# Patient Record
Sex: FEMALE | Race: WHITE | NOT HISPANIC OR LATINO | Employment: FULL TIME | URBAN - METROPOLITAN AREA
[De-identification: names, ages, dates, MRNs, and addresses within clinical notes are randomized per-mention and may not be internally consistent; named-entity substitution may affect disease eponyms.]

---

## 2017-08-24 ENCOUNTER — APPOINTMENT (OUTPATIENT)
Dept: URGENT CARE | Facility: CLINIC | Age: 42
End: 2017-08-24

## 2017-08-24 ENCOUNTER — TRANSCRIBE ORDERS (OUTPATIENT)
Dept: URGENT CARE | Facility: CLINIC | Age: 42
End: 2017-08-24

## 2017-08-24 DIAGNOSIS — Z02.1 PHYSICAL EXAM, PRE-EMPLOYMENT: ICD-10-CM

## 2017-08-24 DIAGNOSIS — Z02.1 PHYSICAL EXAM, PRE-EMPLOYMENT: Primary | ICD-10-CM

## 2017-08-24 PROCEDURE — 36415 COLL VENOUS BLD VENIPUNCTURE: CPT

## 2017-08-24 PROCEDURE — 86480 TB TEST CELL IMMUN MEASURE: CPT

## 2017-08-27 LAB
ANNOTATION COMMENT IMP: NORMAL
GAMMA INTERFERON BACKGROUND BLD IA-ACNC: 0.08 IU/ML
M TB IFN-G BLD-IMP: NEGATIVE
M TB IFN-G CD4+ BCKGRND COR BLD-ACNC: <0.01 IU/ML
M TB IFN-G CD4+ T-CELLS BLD-ACNC: 0.06 IU/ML
MITOGEN IGNF BLD-ACNC: >10 IU/ML
QUANTIFERON-TB GOLD IN TUBE: NORMAL
SERVICE CMNT-IMP: NORMAL

## 2017-12-19 ENCOUNTER — HOSPITAL ENCOUNTER (EMERGENCY)
Facility: HOSPITAL | Age: 42
Discharge: HOME | End: 2017-12-19
Attending: EMERGENCY MEDICINE | Admitting: EMERGENCY MEDICINE
Payer: COMMERCIAL

## 2017-12-19 VITALS
DIASTOLIC BLOOD PRESSURE: 60 MMHG | BODY MASS INDEX: 39.65 KG/M2 | HEART RATE: 116 BPM | OXYGEN SATURATION: 98 % | HEIGHT: 65 IN | SYSTOLIC BLOOD PRESSURE: 112 MMHG | TEMPERATURE: 98.9 F | RESPIRATION RATE: 20 BRPM | WEIGHT: 238 LBS

## 2017-12-19 DIAGNOSIS — R19.7 DIARRHEA: ICD-10-CM

## 2017-12-19 DIAGNOSIS — R11.10 VOMITING: Primary | ICD-10-CM

## 2017-12-19 LAB
ALBUMIN SERPL BCP-MCNC: 4.4 G/DL (ref 3.5–5)
ALP SERPL-CCNC: 71 U/L (ref 46–116)
ALT SERPL W P-5'-P-CCNC: 34 U/L (ref 12–78)
ANION GAP SERPL CALCULATED.3IONS-SCNC: 11 MMOL/L (ref 4–13)
AST SERPL W P-5'-P-CCNC: 27 U/L (ref 5–45)
BASOPHILS # BLD AUTO: 0.01 THOUSANDS/ΜL (ref 0–0.1)
BASOPHILS NFR BLD AUTO: 0 % (ref 0–1)
BILIRUB SERPL-MCNC: 0.6 MG/DL (ref 0.2–1)
BUN SERPL-MCNC: 15 MG/DL (ref 5–25)
CALCIUM SERPL-MCNC: 9.2 MG/DL (ref 8.3–10.1)
CHLORIDE SERPL-SCNC: 101 MMOL/L (ref 100–108)
CO2 SERPL-SCNC: 25 MMOL/L (ref 21–32)
CREAT SERPL-MCNC: 0.91 MG/DL (ref 0.6–1.3)
EOSINOPHIL # BLD AUTO: 0.1 THOUSAND/ΜL (ref 0–0.61)
EOSINOPHIL NFR BLD AUTO: 1 % (ref 0–6)
ERYTHROCYTE [DISTWIDTH] IN BLOOD BY AUTOMATED COUNT: 13.2 % (ref 11.6–15.1)
GFR SERPL CREATININE-BSD FRML MDRD: 78 ML/MIN/1.73SQ M
GLUCOSE SERPL-MCNC: 125 MG/DL (ref 65–140)
HCT VFR BLD AUTO: 46.3 % (ref 34.8–46.1)
HGB BLD-MCNC: 15.7 G/DL (ref 11.5–15.4)
LIPASE SERPL-CCNC: 243 U/L (ref 73–393)
LYMPHOCYTES # BLD AUTO: 0.88 THOUSANDS/ΜL (ref 0.6–4.47)
LYMPHOCYTES NFR BLD AUTO: 7 % (ref 14–44)
MCH RBC QN AUTO: 31.4 PG (ref 26.8–34.3)
MCHC RBC AUTO-ENTMCNC: 33.9 G/DL (ref 31.4–37.4)
MCV RBC AUTO: 93 FL (ref 82–98)
MONOCYTES # BLD AUTO: 0.5 THOUSAND/ΜL (ref 0.17–1.22)
MONOCYTES NFR BLD AUTO: 4 % (ref 4–12)
NEUTROPHILS # BLD AUTO: 11.09 THOUSANDS/ΜL (ref 1.85–7.62)
NEUTS SEG NFR BLD AUTO: 88 % (ref 43–75)
PLATELET # BLD AUTO: 337 THOUSANDS/UL (ref 149–390)
PMV BLD AUTO: 10.5 FL (ref 8.9–12.7)
POTASSIUM SERPL-SCNC: 3.4 MMOL/L (ref 3.5–5.3)
PROT SERPL-MCNC: 8.3 G/DL (ref 6.4–8.2)
RBC # BLD AUTO: 5 MILLION/UL (ref 3.81–5.12)
SODIUM SERPL-SCNC: 137 MMOL/L (ref 136–145)
WBC # BLD AUTO: 12.58 THOUSAND/UL (ref 4.31–10.16)

## 2017-12-19 PROCEDURE — 93005 ELECTROCARDIOGRAM TRACING: CPT | Performed by: EMERGENCY MEDICINE

## 2017-12-19 PROCEDURE — 93005 ELECTROCARDIOGRAM TRACING: CPT

## 2017-12-19 PROCEDURE — 96361 HYDRATE IV INFUSION ADD-ON: CPT

## 2017-12-19 PROCEDURE — 83690 ASSAY OF LIPASE: CPT | Performed by: EMERGENCY MEDICINE

## 2017-12-19 PROCEDURE — 96374 THER/PROPH/DIAG INJ IV PUSH: CPT

## 2017-12-19 PROCEDURE — 96376 TX/PRO/DX INJ SAME DRUG ADON: CPT

## 2017-12-19 PROCEDURE — 80053 COMPREHEN METABOLIC PANEL: CPT | Performed by: EMERGENCY MEDICINE

## 2017-12-19 PROCEDURE — 36415 COLL VENOUS BLD VENIPUNCTURE: CPT | Performed by: EMERGENCY MEDICINE

## 2017-12-19 PROCEDURE — 85025 COMPLETE CBC W/AUTO DIFF WBC: CPT | Performed by: EMERGENCY MEDICINE

## 2017-12-19 PROCEDURE — 99284 EMERGENCY DEPT VISIT MOD MDM: CPT

## 2017-12-19 RX ORDER — ESCITALOPRAM OXALATE 20 MG/1
20 TABLET ORAL DAILY
COMMUNITY
End: 2021-08-04 | Stop reason: ALTCHOICE

## 2017-12-19 RX ORDER — ONDANSETRON 2 MG/ML
INJECTION INTRAMUSCULAR; INTRAVENOUS
Status: COMPLETED
Start: 2017-12-19 | End: 2017-12-19

## 2017-12-19 RX ORDER — ONDANSETRON 2 MG/ML
4 INJECTION INTRAMUSCULAR; INTRAVENOUS ONCE
Status: COMPLETED | OUTPATIENT
Start: 2017-12-19 | End: 2017-12-19

## 2017-12-19 RX ORDER — ASPIRIN 81 MG/1
81 TABLET ORAL DAILY
COMMUNITY
End: 2021-11-03 | Stop reason: HOSPADM

## 2017-12-19 RX ORDER — DIFLUNISAL 500 MG/1
500 TABLET, FILM COATED ORAL 2 TIMES DAILY
COMMUNITY
End: 2021-11-03 | Stop reason: HOSPADM

## 2017-12-19 RX ORDER — TRAZODONE HYDROCHLORIDE 100 MG/1
50 TABLET ORAL
COMMUNITY

## 2017-12-19 RX ORDER — TRAMADOL HYDROCHLORIDE 50 MG/1
50 TABLET ORAL ONCE
Status: DISCONTINUED | OUTPATIENT
Start: 2017-12-19 | End: 2017-12-19 | Stop reason: HOSPADM

## 2017-12-19 RX ORDER — TRAMADOL HYDROCHLORIDE 50 MG/1
50 TABLET ORAL EVERY 6 HOURS PRN
COMMUNITY

## 2017-12-19 RX ORDER — ONDANSETRON 4 MG/1
4 TABLET, FILM COATED ORAL EVERY 6 HOURS PRN
Qty: 12 TABLET | Refills: 0 | Status: SHIPPED | OUTPATIENT
Start: 2017-12-19 | End: 2021-08-04 | Stop reason: ALTCHOICE

## 2017-12-19 RX ADMIN — SODIUM CHLORIDE 1000 ML: 0.9 INJECTION, SOLUTION INTRAVENOUS at 16:00

## 2017-12-19 RX ADMIN — ONDANSETRON 4 MG: 2 INJECTION INTRAMUSCULAR; INTRAVENOUS at 15:45

## 2017-12-19 RX ADMIN — ONDANSETRON 4 MG: 2 INJECTION INTRAMUSCULAR; INTRAVENOUS at 16:54

## 2017-12-19 NOTE — DISCHARGE INSTRUCTIONS
Acute Nausea and Vomiting, Ambulatory Care   GENERAL INFORMATION:   Acute nausea and vomiting  starts suddenly, gets worse quickly, and lasts a short time  Nausea and vomiting may be caused by pregnancy, alcohol, infection, or medicines  Common related symptoms include the following:   · Fever    · Abdominal swelling    · Pain, tenderness, or a lump in the abdomen    · Splashing sounds heard in your stomach when you move  Seek immediate care for the following symptoms:   · Blood in your vomit or bowel movements    · Sudden, severe pain in your chest and upper abdomen after hard vomiting    · Dizziness, dry mouth, and thirst    · Urinating very little or not at all    · Muscle weakness, leg cramps, and trouble breathing    · A heart beat that is faster than normal    · Vomiting for more than 48 hours  Treatment for acute nausea and vomiting  may include medicines to calm your stomach and stop the vomiting  You may need IV fluids if you are dehydrated  Manage your nausea and vomiting:   · Drink liquids as directed to prevent dehydration  Ask how much liquid to drink each day and which liquids are best for you  You may need to drink an oral rehydration solution (ORS)  ORS contains water, salts, and sugar that are needed to replace the lost body fluids  Ask what kind of ORS to use, how much to drink, and where to get it  · Eat smaller meals, more often  Eat small amounts of food every 2 to 3 hours, even if you are not hungry  Food in your stomach may help decrease your nausea  · Avoid stress  Find ways to relax and manage your stress  Headaches due to stress may cause nausea and vomiting  Get more rest and sleep  Follow up with your healthcare provider as directed:  Write down your questions so you remember to ask them during your visits  CARE AGREEMENT:   You have the right to help plan your care  Learn about your health condition and how it may be treated   Discuss treatment options with your caregivers to decide what care you want to receive  You always have the right to refuse treatment  The above information is an  only  It is not intended as medical advice for individual conditions or treatments  Talk to your doctor, nurse or pharmacist before following any medical regimen to see if it is safe and effective for you  © 2014 [address] is for End User's use only and may not be sold, redistributed or otherwise used for commercial purposes  All illustrations and images included in CareNotes® are the copyrighted property of [employer]  or Lucio Del Valle  Acute Diarrhea   WHAT YOU NEED TO KNOW:   Acute diarrhea starts quickly and lasts a short time, usually 1 to 3 days  It can last up to 2 weeks  You may not be able to control your diarrhea  Acute diarrhea usually stops on its own  DISCHARGE INSTRUCTIONS:   Return to the emergency department if:   · You feel confused  · Your heartbeat is faster than normal      · Your eyes look deeply sunken, or you have no tears when you cry  · You urinate less than usual, or your urine is dark yellow  · You have blood or mucus in your stools  · You have severe abdominal pain  · You are unable to drink any liquids  Contact your healthcare provider if:   · Your symptoms do not get better with treatment  · You have a fever higher than 101 3°F (38 5°C)  · You have trouble eating and drinking because you are vomiting  · You are thirsty or have a dry mouth  · Your diarrhea does not get better in 7 days  · You have questions or concerns about your condition or care  Follow up with your healthcare provider as directed:  Write down your questions so you remember to ask them during your visits  Medicines:  · Diarrhea medicine  is an over-the-counter medicine that helps slow or stop your diarrhea   If you take other medicines, talk to your healthcare provider before you take diarrhea medicine  · Antibiotics  may be given to help treat an infection caused by bacteria  · Antiparasitics  may be given to treat an infection caused by parasites  · Take your medicine as directed  Contact your healthcare provider if you think your medicine is not helping or if you have side effects  Tell him of her if you are allergic to any medicine  Keep a list of the medicines, vitamins, and herbs you take  Include the amounts, and when and why you take them  Bring the list or the pill bottles to follow-up visits  Carry your medicine list with you in case of an emergency  Self-care:   · Drink liquids as directed  Liquids will help prevent dehydration caused by diarrhea  Ask your healthcare provider how much liquid to drink each day and which liquids are best for you  You may need to drink an oral rehydration solution (ORS)  An ORS has the right amounts of water, salts, and sugar you need to replace body fluids  You can buy an ORS at most grocery stores and pharmacies  · Eat foods that are easy to digest   Examples include rice, lentils, cereal, bananas, potatoes, and bread  It also includes some fruits (bananas, melon), well-cooked vegetables, and lean meats  Avoid foods high in fiber, fat, and sugar  Also avoid caffeine, alcohol, dairy, and red meat until your diarrhea is gone  Prevent acute diarrhea:   · Wash your hands often  Use soap and water  Wash your hands before you eat or prepare food  Also wash your hands after you use the bathroom  Use an alcohol-based hand gel when soap and water are not available  · Keep bathroom surfaces clean  This helps prevent the spread of germs that cause acute diarrhea  · Wash fruits and vegetables well before you eat them  This can help remove germs that cause diarrhea  If possible, remove the skin from fruits and vegetables, or cook them well before you eat them  · Cook meat as directed        ¨ Cook ground meat  to 160°F      [employer] [facility] poultry, whole poultry, or cuts of poultry  to at least 165°F  Remove the meat from heat  Let it stand for 3 minutes before you eat it  ¨ Cook whole cuts of meat other than poultry  to at least 145°F  Remove the meat from heat  Let it stand for 3 minutes before you eat it  · Wash dishes that have touched raw meat with hot water and soap  This includes cutting boards, utensils, dishes, and serving containers  · Place raw or cooked meat in the refrigerator as soon as possible  Bacteria can grow in meat that is left at room temperature too long  · Do not eat raw or undercooked oysters, clams, or mussels  These foods may be contaminated and cause infection  · Drink filtered or treated water only when you travel  Do not put ice in your drinks  Drink bottled water whenever possible  © 2017 [address] Information is for End User's use only and may not be sold, redistributed or otherwise used for commercial purposes  All illustrations and images included in CareNotes® are the copyrighted property of [employer]  or Lucio Del Valle  The above information is an  only  It is not intended as medical advice for individual conditions or treatments  Talk to your doctor, nurse or pharmacist before following any medical regimen to see if it is safe and effective for you

## 2017-12-19 NOTE — ED PROCEDURE NOTE
PROCEDURE  ECG 12 Lead Documentation  Date/Time: 12/19/2017 4:46 PM  Performed by: Mary Carrasquillo  Authorized by: Mary Carrasquillo     ECG reviewed by me, the ED Provider: yes    Patient location:  ED  Rate:     ECG rate assessment: tachycardic    Rhythm:     Rhythm: sinus tachycardia    Conduction:     Conduction: normal    T waves:     T waves: non-specific

## 2017-12-19 NOTE — ED NOTES
Patient laying in bed and given warm blanket  Call bell left within reach  Will continue to monitor       Roger Ovalle RN  12/19/17 [id number]

## 2017-12-20 LAB
ATRIAL RATE: 121 BPM
P AXIS: 110 DEGREES
PR INTERVAL: 176 MS
QRS AXIS: 83 DEGREES
QRSD INTERVAL: 70 MS
QT INTERVAL: 296 MS
QTC INTERVAL: 420 MS
T WAVE AXIS: 150 DEGREES
VENTRICULAR RATE: 121 BPM

## 2018-07-06 ENCOUNTER — HOSPITAL ENCOUNTER (EMERGENCY)
Facility: HOSPITAL | Age: 43
Discharge: HOME | End: 2018-07-07
Attending: EMERGENCY MEDICINE
Payer: OTHER MISCELLANEOUS

## 2018-07-06 DIAGNOSIS — S39.012A LOW BACK STRAIN, INITIAL ENCOUNTER: Primary | ICD-10-CM

## 2018-07-07 VITALS
RESPIRATION RATE: 18 BRPM | SYSTOLIC BLOOD PRESSURE: 151 MMHG | HEART RATE: 80 BPM | DIASTOLIC BLOOD PRESSURE: 89 MMHG | OXYGEN SATURATION: 98 % | TEMPERATURE: 97.6 F

## 2018-07-07 PROCEDURE — 99283 EMERGENCY DEPT VISIT LOW MDM: CPT

## 2018-07-07 RX ORDER — METHOCARBAMOL 500 MG/1
500 TABLET, FILM COATED ORAL 4 TIMES DAILY PRN
Qty: 20 TABLET | Refills: 0 | Status: SHIPPED | OUTPATIENT
Start: 2018-07-07 | End: 2021-08-04 | Stop reason: ALTCHOICE

## 2018-07-07 RX ORDER — IBUPROFEN 600 MG/1
600 TABLET ORAL ONCE
Status: COMPLETED | OUTPATIENT
Start: 2018-07-07 | End: 2018-07-07

## 2018-07-07 RX ADMIN — IBUPROFEN 600 MG: 600 TABLET ORAL at 00:23

## 2018-07-07 NOTE — ED PROVIDER NOTES
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 Taking?   aspirin (ECOTRIN LOW STRENGTH) 81 mg EC tablet   Yes No   Sig: Take 81 mg by mouth daily   diflunisal (DOLOBID) 500 mg tablet   Yes No   Sig: Take 500 mg by mouth 2 (two) times a day   escitalopram (LEXAPRO) 20 mg tablet   Yes No   Sig: Take 20 mg by mouth daily   ondansetron (ZOFRAN) 4 mg tablet   No No   Sig: Take 1 tablet by mouth every 6 (six) hours as needed for nausea or vomiting   traMADol (ULTRAM) 50 mg tablet   Yes No   Sig: Take 50 mg by mouth every 6 (six) hours as needed for moderate pain   traZODone (DESYREL) 100 mg tablet   Yes No   Sig: Take 50 mg by mouth daily at bedtime      Facility-Administered Medications: None       Past Medical History:   Diagnosis Date    Hyperlipidemia     Psychiatric disorder        Past Surgical History:   Procedure Laterality Date    TOTAL HIP ARTHROPLASTY         History reviewed  No pertinent family history  I have reviewed and agree with the history as documented  Social History   Substance Use Topics    Smoking status: Never Smoker    Smokeless tobacco: Never Used    Alcohol use No        Review of Systems   All other systems reviewed and are negative  Physical Exam  Physical Exam   Constitutional: She appears well-developed and well-nourished     HENT:   Mouth/Throat: Oropharynx is clear and moist  Eyes: Conjunctivae and EOM are normal  Pupils are equal, round, and reactive to light  Neck: Normal range of motion  Neck supple  No spinous process tenderness present  Cardiovascular: Normal rate, regular rhythm, normal heart sounds and intact distal pulses  Pulmonary/Chest: Effort normal and breath sounds normal  No respiratory distress  She has no wheezes  Abdominal: Soft  Bowel sounds are normal  She exhibits no distension  There is no tenderness  Musculoskeletal: Normal range of motion  Lumbar back: She exhibits pain  She exhibits normal range of motion, no bony tenderness, no spasm and normal pulse  Back:    Negative    Neurological: She is alert  She has normal strength  No sensory deficit  GCS eye subscore is 4  GCS verbal subscore is 5  GCS motor subscore is 6  Skin: Skin is warm and dry  No rash noted  Psychiatric: She has a normal mood and affect  Nursing note and vitals reviewed        Vital Signs  ED Triage Vitals   Temperature Pulse Respirations Blood Pressure SpO2   07/06/18 2358 07/06/18 2358 07/06/18 2358 07/07/18 0000 07/06/18 2358   97 6 °F (36 4 °C) 80 18 151/89 98 %      Temp Source Heart Rate Source Patient Position - Orthostatic VS BP Location FiO2 (%)   07/06/18 2358 07/06/18 2358 07/06/18 2358 07/06/18 2358 --   Temporal Monitor Lying Right arm       Pain Score       07/06/18 2358       2           Vitals:    07/06/18 2358 07/07/18 0000   BP:  151/89   Pulse: 80    Patient Position - Orthostatic VS: Lying        Visual Acuity      ED Medications  Medications   ibuprofen (MOTRIN) tablet 600 mg (600 mg Oral Given 7/7/18 0023)       Diagnostic Studies  Results Reviewed     None                 No orders to display              Procedures  Procedures       Phone Contacts  ED Phone Contact    ED Course                               MDM  Number of Diagnoses or Management Options  Low back strain, initial encounter: new and does not require workup  Patient Progress  Patient progress: improved    CritCare Time    Disposition  Final diagnoses:   Low back strain, initial encounter - left     Time reflects when diagnosis was documented in both MDM as applicable and the Disposition within this note     Time User Action Codes Description Comment    7/7/2018 12:12 AM Jolene Avelar Add [S39 012A] Low back strain, initial encounter     7/7/2018 12:12 AM Jolene Avelar Modify [S39 012A] Low back strain, initial encounter left      ED Disposition     ED Disposition Condition Comment    Discharge  Ele Ledbetter discharge to home/self care  Condition at discharge: Good        Follow-up Information     Follow up With Specialties Details Why Contact Info Additional Information    [address] Now [facility] In [facility] days  [address] [facility], [place], [place], [address]          Discharge Medication List as of 7/7/2018 12:14 AM      START taking these medications    Details   methocarbamol (ROBAXIN) 500 mg tablet Take 1 tablet (500 mg total) by mouth 4 (four) times a day as needed for muscle spasms, Starting Sat 7/7/2018, Print         CONTINUE these medications which have NOT CHANGED    Details   aspirin (ECOTRIN LOW STRENGTH) 81 mg EC tablet Take 81 mg by mouth daily, Historical Med      diflunisal (DOLOBID) 500 mg tablet Take 500 mg by mouth 2 (two) times a day, Historical Med      escitalopram (LEXAPRO) 20 mg tablet Take 20 mg by mouth daily, Historical Med      ondansetron (ZOFRAN) 4 mg tablet Take 1 tablet by mouth every 6 (six) hours as needed for nausea or vomiting, Starting Tue 12/19/2017, Print      traMADol (ULTRAM) 50 mg tablet Take 50 mg by mouth every 6 (six) hours as needed for moderate pain, Historical Med      traZODone (DESYREL) 100 mg tablet Take 50 mg by mouth daily at bedtime, Historical Med           No discharge procedures on file      ED Provider  Electronically Signed by           Dayanara Jeter DO  07/07/18 7025

## 2018-07-07 NOTE — DISCHARGE INSTRUCTIONS
Low Back Strain   WHAT YOU NEED TO KNOW:   Low back strain is an injury to your lower back muscles or tendons  Tendons are strong tissues that connect muscles to bones  The lower back supports most of your body weight and helps you move, twist, and bend  DISCHARGE INSTRUCTIONS:   Return to the emergency department if:   · You hear or feel a pop in your lower back  · You have increased swelling or pain in your lower back  · You have trouble moving your legs  · Your legs are numb  Contact your healthcare provider if:   · You have a fever  · Your pain does not go away, even after treatment  · You have questions or concerns about your condition or care  Medicines: The following medicines may be ordered by your healthcare provider:  · Acetaminophen decreases pain and fever  It is available without a doctor's order  Ask how much to take and how often to take it  Follow directions  Acetaminophen can cause liver damage if not taken correctly  · NSAIDs , such as ibuprofen, help decrease swelling, pain, and fever  This medicine is available with or without a doctor's order  NSAIDs can cause stomach bleeding or kidney problems in certain people  If you take blood thinner medicine, always ask your healthcare provider if NSAIDs are safe for you  Always read the medicine label and follow directions  · Muscle relaxers  help decrease pain and muscle spasms  · Prescription pain medicine  may be given  Ask how to take this medicine safely  · Take your medicine as directed  Contact your healthcare provider if you think your medicine is not helping or if you have side effects  Tell him or her if you are allergic to any medicine  Keep a list of the medicines, vitamins, and herbs you take  Include the amounts, and when and why you take them  Bring the list or the pill bottles to follow-up visits  Carry your medicine list with you in case of an emergency  Self-care:   · Rest  as directed   You may need to rest in bed for a period of time after your injury  Do not lift heavy objects  · Apply ice  on your back for 15 to 20 minutes every hour or as directed  Use an ice pack, or put crushed ice in a plastic bag  Cover it with a towel  Ice helps prevent tissue damage and decreases swelling and pain  · Apply heat  on your lower back for 20 to 30 minutes every 2 hours for as many days as directed  Heat helps decrease pain and muscle spasms  · Slowly start to increase your activity  as the pain decreases, or as directed  Prevent another low back strain:   · Use correct body movements  ¨ Bend at the hips and knees when you  objects  Do not bend from the waist  Use your leg muscles as you lift the load  Do not use your back  Keep the object close to your chest as you lift it  Try not to twist or lift anything above your waist     ¨ Change your position often when you stand for long periods of time  Rest one foot on a small box or footrest, and then switch to the other foot often  ¨ Try not to sit for long periods of time  When you do, sit in a straight-backed chair with your feet flat on the floor  ¨ Never reach, pull, or push while you are sitting  · Warm up before you exercise  Do exercises that strengthen your back muscles  Ask your healthcare provider about the best exercise plan for you  · Maintain a healthy weight  Ask your healthcare provider how much you should weigh  Ask him to help you create a weight loss plan if you are overweight  © 2017 [address] Information is for End User's use only and may not be sold, redistributed or otherwise used for commercial purposes  All illustrations and images included in CareNotes® are the copyrighted property of [employer]  or Luico Del Valle  The above information is an  only  It is not intended as medical advice for individual conditions or treatments   Talk to your doctor, nurse or pharmacist before following any medical regimen to see if it is safe and effective for you

## 2018-07-09 ENCOUNTER — APPOINTMENT (OUTPATIENT)
Dept: URGENT CARE | Facility: CLINIC | Age: 43
End: 2018-07-09
Payer: OTHER MISCELLANEOUS

## 2018-07-09 PROCEDURE — G0382 LEV 3 HOSP TYPE B ED VISIT: HCPCS | Performed by: NURSE PRACTITIONER

## 2018-07-09 PROCEDURE — 99283 EMERGENCY DEPT VISIT LOW MDM: CPT | Performed by: NURSE PRACTITIONER

## 2020-01-13 ENCOUNTER — OCCMED (OUTPATIENT)
Dept: URGENT CARE | Facility: CLINIC | Age: 45
End: 2020-01-13
Payer: OTHER MISCELLANEOUS

## 2020-01-13 ENCOUNTER — APPOINTMENT (OUTPATIENT)
Dept: RADIOLOGY | Facility: CLINIC | Age: 45
End: 2020-01-13
Payer: OTHER MISCELLANEOUS

## 2020-01-13 DIAGNOSIS — M25.551 RIGHT HIP PAIN: Primary | ICD-10-CM

## 2020-01-13 DIAGNOSIS — M25.551 RIGHT HIP PAIN: ICD-10-CM

## 2020-01-13 DIAGNOSIS — M54.41 ACUTE RIGHT-SIDED LOW BACK PAIN WITH RIGHT-SIDED SCIATICA: ICD-10-CM

## 2020-01-13 PROCEDURE — 99283 EMERGENCY DEPT VISIT LOW MDM: CPT | Performed by: PHYSICIAN ASSISTANT

## 2020-01-13 PROCEDURE — G0382 LEV 3 HOSP TYPE B ED VISIT: HCPCS | Performed by: PHYSICIAN ASSISTANT

## 2020-01-13 PROCEDURE — 73502 X-RAY EXAM HIP UNI 2-3 VIEWS: CPT

## 2020-01-15 ENCOUNTER — APPOINTMENT (OUTPATIENT)
Dept: URGENT CARE | Facility: CLINIC | Age: 45
End: 2020-01-15
Payer: OTHER MISCELLANEOUS

## 2020-01-15 PROCEDURE — 99213 OFFICE O/P EST LOW 20 MIN: CPT | Performed by: PHYSICIAN ASSISTANT

## 2020-09-07 ENCOUNTER — APPOINTMENT (OUTPATIENT)
Dept: RADIOLOGY | Facility: CLINIC | Age: 45
End: 2020-09-07

## 2020-09-07 DIAGNOSIS — M25.552 LEFT HIP PAIN: ICD-10-CM

## 2020-09-07 DIAGNOSIS — E78.5 HYPERLIPEMIA: ICD-10-CM

## 2020-09-07 PROCEDURE — 73502 X-RAY EXAM HIP UNI 2-3 VIEWS: CPT

## 2020-11-11 ENCOUNTER — OFFICE VISIT (OUTPATIENT)
Dept: URGENT CARE | Facility: CLINIC | Age: 45
End: 2020-11-11
Payer: COMMERCIAL

## 2020-11-11 VITALS — RESPIRATION RATE: 19 BRPM | HEART RATE: 111 BPM | TEMPERATURE: 97.4 F | OXYGEN SATURATION: 96 %

## 2020-11-11 DIAGNOSIS — R52 BODY ACHES: ICD-10-CM

## 2020-11-11 DIAGNOSIS — Z11.59 SCREENING FOR VIRAL DISEASE: Primary | ICD-10-CM

## 2020-11-11 PROCEDURE — G0382 LEV 3 HOSP TYPE B ED VISIT: HCPCS | Performed by: PHYSICIAN ASSISTANT

## 2020-11-11 PROCEDURE — U0003 INFECTIOUS AGENT DETECTION BY NUCLEIC ACID (DNA OR RNA); SEVERE ACUTE RESPIRATORY SYNDROME CORONAVIRUS 2 (SARS-COV-2) (CORONAVIRUS DISEASE [COVID-19]), AMPLIFIED PROBE TECHNIQUE, MAKING USE OF HIGH THROUGHPUT TECHNOLOGIES AS DESCRIBED BY CMS-2020-01-R: HCPCS | Performed by: PHYSICIAN ASSISTANT

## 2020-11-12 ENCOUNTER — TELEPHONE (OUTPATIENT)
Dept: URGENT CARE | Facility: CLINIC | Age: 45
End: 2020-11-12

## 2020-11-12 LAB — SARS-COV-2 RNA SPEC QL NAA+PROBE: NOT DETECTED

## 2020-11-28 ENCOUNTER — OFFICE VISIT (OUTPATIENT)
Dept: URGENT CARE | Facility: CLINIC | Age: 45
End: 2020-11-28
Payer: COMMERCIAL

## 2020-11-28 DIAGNOSIS — L03.011 PARONYCHIA OF FINGER OF RIGHT HAND: Primary | ICD-10-CM

## 2020-11-28 PROCEDURE — G0382 LEV 3 HOSP TYPE B ED VISIT: HCPCS | Performed by: PHYSICIAN ASSISTANT

## 2020-11-28 RX ORDER — AMOXICILLIN AND CLAVULANATE POTASSIUM 875; 125 MG/1; MG/1
1 TABLET, FILM COATED ORAL EVERY 12 HOURS SCHEDULED
Qty: 20 TABLET | Refills: 0 | Status: SHIPPED | COMMUNITY
Start: 2020-11-28 | End: 2020-12-08

## 2021-01-26 ENCOUNTER — IMMUNIZATIONS (OUTPATIENT)
Dept: FAMILY MEDICINE CLINIC | Facility: HOSPITAL | Age: 46
End: 2021-01-26

## 2021-01-26 DIAGNOSIS — Z23 ENCOUNTER FOR IMMUNIZATION: Primary | ICD-10-CM

## 2021-01-26 PROCEDURE — 0011A SARS-COV-2 / COVID-19 MRNA VACCINE (MODERNA) 100 MCG: CPT

## 2021-01-26 PROCEDURE — 91301 SARS-COV-2 / COVID-19 MRNA VACCINE (MODERNA) 100 MCG: CPT

## 2021-02-22 ENCOUNTER — IMMUNIZATIONS (OUTPATIENT)
Dept: FAMILY MEDICINE CLINIC | Facility: HOSPITAL | Age: 46
End: 2021-02-22

## 2021-02-22 DIAGNOSIS — Z23 ENCOUNTER FOR IMMUNIZATION: Primary | ICD-10-CM

## 2021-02-22 PROCEDURE — 91301 SARS-COV-2 / COVID-19 MRNA VACCINE (MODERNA) 100 MCG: CPT

## 2021-02-22 PROCEDURE — 0012A SARS-COV-2 / COVID-19 MRNA VACCINE (MODERNA) 100 MCG: CPT

## 2021-03-20 DIAGNOSIS — M54.41 ACUTE RIGHT-SIDED LOW BACK PAIN WITH RIGHT-SIDED SCIATICA: Primary | ICD-10-CM

## 2021-03-20 RX ORDER — PREDNISONE 20 MG/1
40 TABLET ORAL DAILY
Qty: 10 TABLET | Refills: 0 | Status: SHIPPED | OUTPATIENT
Start: 2021-03-20 | End: 2021-03-25

## 2021-03-21 DIAGNOSIS — M54.41 ACUTE RIGHT-SIDED LOW BACK PAIN WITH RIGHT-SIDED SCIATICA: Primary | ICD-10-CM

## 2021-03-26 ENCOUNTER — EVALUATION (OUTPATIENT)
Dept: PHYSICAL THERAPY | Facility: CLINIC | Age: 46
End: 2021-03-26
Payer: COMMERCIAL

## 2021-03-26 ENCOUNTER — TRANSCRIBE ORDERS (OUTPATIENT)
Dept: PHYSICAL THERAPY | Facility: CLINIC | Age: 46
End: 2021-03-26

## 2021-03-26 DIAGNOSIS — M54.41 ACUTE RIGHT-SIDED LOW BACK PAIN WITH RIGHT-SIDED SCIATICA: ICD-10-CM

## 2021-03-26 DIAGNOSIS — M54.41 ACUTE RIGHT-SIDED LOW BACK PAIN WITH RIGHT-SIDED SCIATICA: Primary | ICD-10-CM

## 2021-03-26 PROCEDURE — 97110 THERAPEUTIC EXERCISES: CPT | Performed by: PHYSICAL THERAPIST

## 2021-03-26 PROCEDURE — 97162 PT EVAL MOD COMPLEX 30 MIN: CPT | Performed by: PHYSICAL THERAPIST

## 2021-03-26 NOTE — PROGRESS NOTES
PT Evaluation     Today's date: 3/26/2021  Patient name: Aidan Rosenthal  : 1975  MRN: [id number]  Referring provider: Jalen Wills PT  Dx:   Encounter Diagnosis     ICD-10-CM    1  Acute right-sided low back pain with right-sided sciatica  M54 41 Ambulatory referral to Physical Therapy                  Assessment  Assessment details: Aidan Rosenthal is a 55 y o  female presenting to outpatient physical therapy with chief complaints of LBP with (L) buttocks pain and HS pain as well as (R) hip pain radiating to the knee  Patient describes chronic LBP and deconditioning following (R) MAGDALENA 6 years ago  Patient displays with abnormal gait, good lumbar mobility, good hip mobility, (B) hip strength deficits,   Patient's symptoms are multifactorial in nature with a primary movement diagnosis of deconditioning and central sensitization resulting in pathoanatomical signs and symptoms consistent with (R) sided LBP with sciatica resulting in limitations in her ability to participate in housework and recreational activities  No further referral appears necessary at this time based upon examination results  Please contact me if you have any questions ([phone])  Thank you for the opportunity to share in the care of this patient  Impairments: abnormal gait, abnormal muscle tone, abnormal or restricted ROM, abnormal movement, activity intolerance, impaired physical strength, lacks appropriate home exercise program, pain with function, poor posture  and poor body mechanics    Symptom irritability: highUnderstanding of Dx/Px/POC: good   Prognosis: fair  Prognosis details: Positive prognostic indicators include positive attitude toward recovery, motivated to improve, good understanding of condition, realistic expectations  Negative prognostic indicators include chronicity of symptoms, high symptom irritability, fear avoidance  Goals  STG to be met in 4 weeks:  - Increase (B) LE strength by 1/2 MMT grade    - I with HEP   - Patient will be able to decrease pain medication use by 50%  LTG to be met in 8 weeks:  - Increase (B) LE strength to 4+/5 all planes  - I with updated HEP   - Patient will be able to return to hiking 2-3 miles  - Patient will be able to perform housework without increased pain  Plan  Plan details: Prognosis above is given PT services 2x/week tapering to 1x/week over the next 8 weeks and home program adherence  Patient would benefit from: skilled physical therapy  Planned modality interventions: cryotherapy and thermotherapy: hydrocollator packs  Planned therapy interventions: activity modification, joint mobilization, manual therapy, neuromuscular re-education, patient education, postural training, strengthening, stretching, therapeutic exercise, therapeutic activities, functional ROM exercises, home exercise program, gait training and body mechanics training  Plan of Care beginning date: 3/26/2021  Plan of Care expiration date: 2021  Treatment plan discussed with: patient and PTA        Subjective Evaluation    History of Present Illness  Mechanism of injury: At Evaluation (2021): Patient reports a history of (R) hip fracture in  - developed AVN 6 years ago - had a (R) MAGDALENA performed - she could not follow up with PT after secondary to no insurance  She reports having chronic episodic lower back pain for many years  She reports recently she had a flare up of symptoms in the (L) buttocks and hamstring, (R) hip, and lower back  She saw Dr Sanjeev Agudelo - PT was recommended        Red Flag Screen:  Patient denies recent fever, changes in bowel and bladder function, nausea and vomiting, weakness, unexplained weight loss, tingling, numbness, pain with coughing, or traumatic CLAUDIA   Patient reports radiating pain in the (R) thigh       Greatest concern: limited activity - hiking, cleaning house, working    Quality of life: good    Pain  Current pain ratin  At best pain ratin  At worst pain ratin  Location: Central lower back - radiating symptoms in the (R) leg; pain in (L) buttocks and HS region   Quality: radiating and dull ache    Social Support  Steps to enter house: yes  Stairs in house: yes   Lives in: multiple-level home  Lives with: significant other    Employment status: working (Nurse)    Diagnostic Tests  X-ray: abnormal  Treatments  Previous treatment: massage and medication  Patient Goals  Patient goal: Patient Specific Functional Scale: get through a 12 hour work day with less pain 3/10, perform house cleaning with less pain 3/10, return to hiking 2-3 miles 2/10; Total         Objective     Static Posture   General Observations  Asymmetrical weight bearing and shifted left  Lumbar Spine   Increased lordosis  Palpation     Additional Palpation Details  Significant sensitivity over scar on (R) leg   TTP lumbar paraspinals - (L) HS Region, (R) lateral hip     Active Range of Motion     Lumbar   Flexion:  WFL and with pain  Extension:  [facility]    Additional Active Range of Motion Details  (B) SG: unrestricted - increased pain with hips to (L)    Strength/Myotome Testing     Left Hip   Planes of Motion   Flexion: 5  Extension: 4+  Abduction: 3+    Right Hip   Planes of Motion   Flexion: 5  Extension: 4-  Abduction: 4    Left Knee   Flexion: 4+  Extension: 4+    Right Knee   Flexion: 4+  Extension: 4+    Left Ankle/Foot   Dorsiflexion: 4+  Great toe extension: 4+    Right Ankle/Foot   Dorsiflexion: 4+  Great toe extension: 4+    Tests     Lumbar     Left   Negative crossed SLR and passive SLR  Right   Negative crossed SLR and passive SLR  Left Hip   Negative LIZZIE and FADIR       Additional Tests Details  Static Position Testing:   Hooklying position: decreased pain  PPT with Hooklying position: decreased pain                 Daily Treatment Diary     DX: acute LBP  EPOC: 21  Precautions: standard  CO-MORBIDITIES: (R) MAGDALENA  HEP ACCESS CODE: [id number]    Stage: acute on chronic   Stability of Symptoms:  At Evaluation: stable - unchanged  Global Rating of Change:   Symptom Irritability Level: high  Primary Movement Diagnosis:   Patient Specific Functional Scale:   3/26/21: get through a 12 hour work day with less pain 3/10, perform house cleaning with less pain 3/10, return to hiking 2-3 miles 2/10;  Total 8/30  FOTO Prediction: 56 by visit 12  FOTO Progress: 51 at evaluation   Greatest Concern: limited activity - hiking, cleaning house, working  Current Activity Recommendations: added HEP, desensitization techniques for (R) sided scar, speak to nutritionist to establish plan for weight loss (3/26)  Current Educational Needs: progressions, nature of pain, pain does not equal harm      Manual          Lumbar Mobs         Lumbar STM                                        Exercise Diary  HEP         Therapeutic Exercise           Recumbent Bike                    Abdominal Brace          LFS: Alt LE          H/L Hip ADD Isometric                    SLR          S/L Hip ABD          Bridge                               Neuromuscular Reeducation          1/2 Kneeling Chop and Lift with CC                    CC Side Stepping                    TB Side Stepping          TB W Stepping                     FWD Lunge          LAT Lunge          Mini Squat                    FWD Step Up          LAT Step Up                    Therapeutic Activity                              Gait Training                        Modalities

## 2021-03-26 NOTE — LETTER
2021    Enedina Heimlich, MD  [address] [address]    Patient: Rosi Almaraz   YOB: 1975   Date of Visit: 3/26/2021     Encounter Diagnosis     ICD-10-CM    1  Acute right-sided low back pain with right-sided sciatica  M54 41 Ambulatory referral to Physical Therapy       Dear Dr Cj Reilly: Thank you for your recent referral of Rosi Almaraz  Please review the attached evaluation summary from Brionna's recent visit  Please verify that you agree with the plan of care by signing the attached order  If you have any questions or concerns, please do not hesitate to call  I sincerely appreciate the opportunity to share in the care of one of your patients and hope to have another opportunity to work with you in the near future  Sincerely,    Pollo Barr, PT      Referring Provider:      I certify that I have read the below Plan of Care and certify the need for these services furnished under this plan of treatment while under my care  Enedina Heimlich, MD  [address] [place] [id number]  Via Fax: [phone]          PT Evaluation     Today's date: 3/26/2021  Patient name: Rosi Almaraz  : 1975  MRN: [id number]  Referring provider: Arthur Whittington PT  Dx:   Encounter Diagnosis     ICD-10-CM    1  Acute right-sided low back pain with right-sided sciatica  M54 41 Ambulatory referral to Physical Therapy                  Assessment  Assessment details: Rosi Almaraz is a 55 y o  female presenting to outpatient physical therapy with chief complaints of LBP with (L) buttocks pain and HS pain as well as (R) hip pain radiating to the knee  Patient describes chronic LBP and deconditioning following (R) MAGDALENA 6 years ago  Patient displays with abnormal gait, good lumbar mobility, good hip mobility, (B) hip strength deficits,     Patient's symptoms are multifactorial in nature with a primary movement diagnosis of deconditioning and central sensitization resulting in pathoanatomical signs and symptoms consistent with ____ resulting in limitations in her ability to ____  No further referral appears necessary at this time based upon examination results  Please contact me if you have any questions ([phone])  Thank you for the opportunity to share in the care of this patient  Impairments: abnormal gait, abnormal muscle tone, abnormal or restricted ROM, abnormal movement, activity intolerance, impaired physical strength, lacks appropriate home exercise program, pain with function, poor posture  and poor body mechanics    Symptom irritability: highUnderstanding of Dx/Px/POC: good   Prognosis: fair  Prognosis details: Positive prognostic indicators include positive attitude toward recovery, motivated to improve, good understanding of condition, realistic expectations  Negative prognostic indicators include chronicity of symptoms, high symptom irritability, fear avoidance  Goals  STG to be met in 4 weeks:  - Increase (B) LE strength by 1/2 MMT grade  - I with HEP   - Patient will be able to decrease pain medication use by 50%  LTG to be met in 8 weeks:  - Increase (B) LE strength to 4+/5 all planes  - I with updated HEP   - Patient will be able to return to hiking 2-3 miles  - Patient will be able to perform housework without increased pain  Plan  Plan details: Prognosis above is given PT services 2x/week tapering to 1x/week over the next 8 weeks and home program adherence       Patient would benefit from: skilled physical therapy  Planned modality interventions: cryotherapy and thermotherapy: hydrocollator packs  Planned therapy interventions: activity modification, joint mobilization, manual therapy, neuromuscular re-education, patient education, postural training, strengthening, stretching, therapeutic exercise, therapeutic activities, functional ROM exercises, home exercise program, gait training and body mechanics training  Plan of Care beginning date: 3/26/2021  Plan of Care expiration date: 2021  Treatment plan discussed with: patient and PTA        Subjective Evaluation    History of Present Illness  Mechanism of injury: At Evaluation (2021): Patient reports a history of (R) hip fracture in  - developed AVN 6 years ago - had a (R) MAGDALENA performed - she could not follow up with PT after secondary to no insurance  She reports having chronic episodic lower back pain for many years  She reports recently she had a flare up of symptoms in the (L) buttocks and hamstring, (R) hip, and lower back  She saw Dr Cyndi Cruz - PT was recommended  Red Flag Screen:  Patient denies recent fever, changes in bowel and bladder function, nausea and vomiting, weakness, unexplained weight loss, tingling, numbness, pain with coughing, or traumatic CLAUDIA   Patient reports radiating pain in the (R) thigh       Greatest concern: limited activity - hiking, cleaning house, working    Quality of life: good    Pain  Current pain ratin  At best pain ratin  At worst pain ratin  Location: Central lower back - radiating symptoms in the (R) leg; pain in (L) buttocks and HS region   Quality: radiating and dull ache    Social Support  Steps to enter house: yes  Stairs in house: yes   Lives in: multiple-level home  Lives with: significant other    Employment status: working (Nurse)    Diagnostic Tests  X-ray: abnormal  Treatments  Previous treatment: massage and medication  Patient Goals  Patient goal: Patient Specific Functional Scale: get through a 12 hour work day with less pain 3/10, perform house cleaning with less pain 3/10, return to hiking 2-3 miles /10; Total         Objective     Static Posture   General Observations  Asymmetrical weight bearing and shifted left  Lumbar Spine   Increased lordosis       Palpation     Additional Palpation Details  Significant sensitivity over scar on (R) leg   TTP lumbar paraspinals - (L) HS Region, (R) lateral hip     Active Range of Motion     Lumbar   Flexion:  WFL and with pain  Extension:  [facility]    Additional Active Range of Motion Details  (B) SG: unrestricted - increased pain with hips to (L)    Strength/Myotome Testing     Left Hip   Planes of Motion   Flexion: 5  Extension: 4+  Abduction: 3+    Right Hip   Planes of Motion   Flexion: 5  Extension: 4-  Abduction: 4    Left Knee   Flexion: 4+  Extension: 4+    Right Knee   Flexion: 4+  Extension: 4+    Left Ankle/Foot   Dorsiflexion: 4+  Great toe extension: 4+    Right Ankle/Foot   Dorsiflexion: 4+  Great toe extension: 4+    Tests     Lumbar     Left   Negative crossed SLR and passive SLR  Right   Negative crossed SLR and passive SLR  Left Hip   Negative LIZZIE and FADIR  Additional Tests Details  Static Position Testing:   Hooklying position: decreased pain  PPT with Hooklying position: decreased pain                 Daily Treatment Diary     DX: acute LBP  EPOC: 5/21/21  Precautions: standard  CO-MORBIDITIES: (R) MAGDALENA  HEP ACCESS CODE: [id number]    Stage: acute on chronic   Stability of Symptoms:  At Evaluation: stable - unchanged  Global Rating of Change:   Symptom Irritability Level: high  Primary Movement Diagnosis:   Patient Specific Functional Scale:   3/26/21: get through a 12 hour work day with less pain 3/10, perform house cleaning with less pain 3/10, return to hiking 2-3 miles 2/10;  Total 8/30  FOTO Prediction: 56 by visit 12  FOTO Progress: 51 at evaluation   Greatest Concern: limited activity - hiking, cleaning house, working  Current Activity Recommendations: added HEP, desensitization techniques for (R) sided scar, speak to nutritionist to establish plan for weight loss (3/26)  Current Educational Needs: progressions, nature of pain, pain does not equal harm      Manual          Lumbar Mobs         Lumbar STM                                        Exercise Diary  HEP Therapeutic Exercise           Recumbent Bike                    Abdominal Brace          LFS: Alt LE          H/L Hip ADD Isometric                    SLR          S/L Hip ABD          Bridge                               Neuromuscular Reeducation          1/2 Kneeling Chop and Lift with CC                    CC Side Stepping                    TB Side Stepping          TB W Stepping                     FWD Lunge          LAT Lunge          Mini Squat                    FWD Step Up          LAT Step Up                    Therapeutic Activity                              Gait Training                        Modalities

## 2021-03-30 ENCOUNTER — OFFICE VISIT (OUTPATIENT)
Dept: PHYSICAL THERAPY | Facility: CLINIC | Age: 46
End: 2021-03-30
Payer: COMMERCIAL

## 2021-03-30 DIAGNOSIS — M54.41 ACUTE RIGHT-SIDED LOW BACK PAIN WITH RIGHT-SIDED SCIATICA: Primary | ICD-10-CM

## 2021-03-30 PROCEDURE — 97112 NEUROMUSCULAR REEDUCATION: CPT | Performed by: PHYSICAL THERAPIST

## 2021-03-30 PROCEDURE — 97140 MANUAL THERAPY 1/> REGIONS: CPT | Performed by: PHYSICAL THERAPIST

## 2021-03-30 PROCEDURE — 97110 THERAPEUTIC EXERCISES: CPT | Performed by: PHYSICAL THERAPIST

## 2021-03-30 NOTE — PROGRESS NOTES
Daily Note     Today's date: 3/30/2021  Patient name: Asia Carmichael  : 1975  MRN: [id number]  Referring provider: John Yañez PT  Dx:   Encounter Diagnosis     ICD-10-CM    1  Acute right-sided low back pain with right-sided sciatica  M54 41                   Subjective: Patient reports good tolerance to her IE  She reports she was very busy over the weekend with work  She notes her HEP is going well - did not perform after her second 12 hour shift  She notes some difficulty with S/L Hip abduction  She reports feeling she is starting to have some increased pain secondary to stopping prednisone  Objective: See treatment diary below      Assessment:   Stage: acute on chronic   Stability of Symptoms:  At Evaluation: stable - unchanged  Global Rating of Change:   Symptom Irritability Level: high  Primary Movement Diagnosis:   Patient Specific Functional Scale:   3/26/21: get through a 12 hour work day with less pain 3/10, perform house cleaning with less pain 3/10, return to hiking 2-3 miles 2/10; Total   FOTO Prediction: 56 by visit 12  FOTO Progress: 51 at evaluation   Greatest Concern: limited activity - hiking, cleaning house, working  Current Activity Recommendations: added HEP, desensitization techniques for (R) sided scar, speak to nutritionist to establish plan for weight loss (3/26)  Current Educational Needs: progressions, nature of pain, pain does not equal harm    Patient had good tolerance to manual treatment - decreased pain post treatment  She required cuing for exercise form with abdominal bracing  She was very fatigued with exercises performed  S/L hip abduction was switched to S/L clamshells - still challenging but able to complete with good form  She was educated on importance to move spine in all directions and build her hip strength  She had decreased pain post treatment       Plan: Continue with POC - monitor tolerance to treatment     Daily Treatment Diary     DX: acute LBP  EPOC: 5/21/21  Precautions: standard  CO-MORBIDITIES: (R) MAGDALENA  HEP ACCESS CODE: [id number]      Manual  3/30        Lumbar Mobs         Lumbar STM                                        Exercise Diary  HEP 3/30        Therapeutic Exercise           Recumbent Bike  8 min                  Abdominal Brace 3/26 5"x10        LFS: Alt LE 3/26 20x ea        H/L Hip ADD Isometric 3/26 5"x15                  SLR 3/26 10x ea        S/L Hip ABD 3/26         Bridge  3/26 5"x10        S/L Clamshell   10x ea                  Neuromuscular Reeducation          Quadruped  Alt UE          Alt LE          Opp UE/LE          [place]                     1/2 Kneeling Chop and Lift with CC                    CC Side Stepping                    TB Side Stepping          TB W Stepping                     FWD Lunge          LAT Lunge          Mini Squat                    FWD Step Up          LAT Step Up                    Therapeutic Activity                              Gait Training                        Modalities

## 2021-04-01 ENCOUNTER — OFFICE VISIT (OUTPATIENT)
Dept: PHYSICAL THERAPY | Facility: CLINIC | Age: 46
End: 2021-04-01
Payer: COMMERCIAL

## 2021-04-01 DIAGNOSIS — M54.41 ACUTE RIGHT-SIDED LOW BACK PAIN WITH RIGHT-SIDED SCIATICA: Primary | ICD-10-CM

## 2021-04-01 PROCEDURE — 97110 THERAPEUTIC EXERCISES: CPT | Performed by: PHYSICAL THERAPIST

## 2021-04-01 PROCEDURE — 97112 NEUROMUSCULAR REEDUCATION: CPT | Performed by: PHYSICAL THERAPIST

## 2021-04-01 NOTE — PROGRESS NOTES
Daily Note     Today's date: 2021  Patient name: Asha Zheng  : 1975  MRN: [id number]  Referring provider: Kristan Stubbs, ABILIO  Dx:   Encounter Diagnosis     ICD-10-CM    1  Acute right-sided low back pain with right-sided sciatica  M54 41                   Subjective: Patient reports slight increase in (R) hip joint pain for about 30 minutes post treatment  She reports good tolerance to initial exercises  She reports progressing with hip abduction exercises - performed both S/L hip abduction and S/L clamshells  Objective: See treatment diary below      Assessment:   Stage: acute on chronic   Stability of Symptoms:  At Evaluation: stable - unchanged  Global Rating of Change:   Symptom Irritability Level: high  Primary Movement Diagnosis:   Patient Specific Functional Scale:   3/26/21: get through a 12 hour work day with less pain 3/10, perform house cleaning with less pain 3/10, return to hiking 2-3 miles 2/10; Total   FOTO Prediction: 56 by visit 12  FOTO Progress: 51 at evaluation   Greatest Concern: limited activity - hiking, cleaning house, working  Current Activity Recommendations: added HEP, desensitization techniques for (R) sided scar, speak to nutritionist to establish plan for weight loss (3/26); added to HEP ()  Current Educational Needs: progressions, nature of pain, pain does not equal harm    Patient demonstrated good form with previously issued HEP  She requires cuing for form with abdominal bracing  She had hip fatigue with hip abduction and TB counterbalance exercises  She was progressed with HEP  She had no complaints of joint pain post treatment         Plan: Continue with POC - monitor tolerance to treatment     Daily Treatment Diary     DX: acute LBP  EPOC: 21  Precautions: standard  CO-MORBIDITIES: (R) MAGDALENA  HEP ACCESS CODE: [id number]      Manual  3/30        Lumbar Mobs         Lumbar STM                                        Exercise Diary  HEP 3/30 4/1 Therapeutic Exercise           Recumbent Bike  8 min 8 min                 Abdominal Brace 3/26 5"x10 5"x10       LFS: Alt LE 3/26 20x ea 20x ea       H/L Hip ADD Isometric 3/26 5"x15 5"x20                 SLR 3/26 10x ea 10x ea       S/L Hip ABD 3/26  10x ea       Bridge  3/26 5"x10 5"x15       S/L Clamshell   10x ea                  Neuromuscular Reeducation          TB Counter Balance  4/1  OTB  10x ea                                               1/2 Kneeling Chop and Lift with CC   (L) Kneel  10x                 CC Side Stepping                    TB Side Stepping          TB W Stepping                     FWD Lunge          LAT Lunge          Mini Squat                    FWD Step Up          LAT Step Up                    Therapeutic Activity                              Gait Training                        Modalities

## 2021-04-06 ENCOUNTER — APPOINTMENT (OUTPATIENT)
Dept: PHYSICAL THERAPY | Facility: CLINIC | Age: 46
End: 2021-04-06
Payer: COMMERCIAL

## 2021-04-08 ENCOUNTER — APPOINTMENT (OUTPATIENT)
Dept: PHYSICAL THERAPY | Facility: CLINIC | Age: 46
End: 2021-04-08
Payer: COMMERCIAL

## 2021-04-11 ENCOUNTER — OFFICE VISIT (OUTPATIENT)
Dept: URGENT CARE | Facility: CLINIC | Age: 46
End: 2021-04-11
Payer: COMMERCIAL

## 2021-04-11 VITALS
DIASTOLIC BLOOD PRESSURE: 80 MMHG | HEIGHT: 65 IN | SYSTOLIC BLOOD PRESSURE: 130 MMHG | HEART RATE: 87 BPM | WEIGHT: 236 LBS | TEMPERATURE: 97.5 F | BODY MASS INDEX: 39.32 KG/M2

## 2021-04-11 DIAGNOSIS — J01.90 ACUTE NON-RECURRENT SINUSITIS, UNSPECIFIED LOCATION: Primary | ICD-10-CM

## 2021-04-11 PROCEDURE — G0382 LEV 3 HOSP TYPE B ED VISIT: HCPCS | Performed by: PHYSICIAN ASSISTANT

## 2021-04-11 RX ORDER — DULOXETIN HYDROCHLORIDE 60 MG/1
90 CAPSULE, DELAYED RELEASE ORAL DAILY
COMMUNITY

## 2021-04-11 RX ORDER — AMOXICILLIN AND CLAVULANATE POTASSIUM 875; 125 MG/1; MG/1
1 TABLET, FILM COATED ORAL EVERY 12 HOURS SCHEDULED
Qty: 20 TABLET | Refills: 0 | Status: SHIPPED | COMMUNITY
Start: 2021-04-11 | End: 2021-04-11 | Stop reason: SDUPTHER

## 2021-04-11 RX ORDER — AMOXICILLIN AND CLAVULANATE POTASSIUM 875; 125 MG/1; MG/1
1 TABLET, FILM COATED ORAL EVERY 12 HOURS SCHEDULED
Qty: 20 TABLET | Refills: 0 | Status: SHIPPED | OUTPATIENT
Start: 2021-04-11 | End: 2021-04-21

## 2021-04-11 NOTE — PATIENT INSTRUCTIONS

## 2021-04-13 NOTE — PROGRESS NOTES
[address] [facility]        NAME: Chantell Thurston is a 55 y o  female  : 1975    MRN: [id number]  DATE:   2021  TIME: 12:42 PM    Assessment and Plan   Acute non-recurrent sinusitis, unspecified location [J01 90]  1  Acute non-recurrent sinusitis, unspecified location  amoxicillin-clavulanate (AUGMENTIN) 875-125 mg per tablet    DISCONTINUED: amoxicillin-clavulanate (AUGMENTIN) 875-125 mg per tablet         Patient Instructions     Discussed condition with pt  She has acute sinusitis which I will treat with an oral abx and rec hydration, rest, discussed OTC cough/cold meds, and observation  Should be re-evaluated if condition persists or worsens  Follow up with PCP in 3-5 days  Proceed to  ER if symptoms worsen  Chief Complaint     Chief Complaint   Patient presents with    Sinusitis     sinus pressure for a week          History of Present Illness         Patient presents with one-week history of sinus pain/ pressure /congestion, headache  Denies any significant discharge, sore throat, cough, fever, chills, N/ V/D, or known direct exposure to COVID-19  Has been managing symptoms with over-the-counter medications  Denies asthma or allergies  Does not smoke  Review of Systems   Review of Systems   Constitutional: Negative  HENT: Positive for congestion, sinus pressure and sinus pain  Negative for postnasal drip and sore throat  Respiratory: Negative  Cardiovascular: Negative  Gastrointestinal: Negative  Genitourinary: Negative  Neurological: Positive for headaches           Current Medications       Current Outpatient Medications:     amoxicillin-clavulanate (AUGMENTIN) 875-125 mg per tablet, Take 1 tablet by mouth every 12 (twelve) hours for 10 days, Disp: 20 tablet, Rfl: 0    aspirin (ECOTRIN LOW STRENGTH) 81 mg EC tablet, Take 81 mg by mouth daily, Disp: , Rfl:     diflunisal (DOLOBID) 500 mg tablet, Take 500 mg by mouth 2 (two) times a day, Disp: , Rfl:   DULoxetine (CYMBALTA) 60 mg delayed release capsule, Take 60 mg by mouth daily, Disp: , Rfl:     traMADol (ULTRAM) 50 mg tablet, Take 50 mg by mouth every 6 (six) hours as needed for moderate pain, Disp: , Rfl:     traZODone (DESYREL) 100 mg tablet, Take 50 mg by mouth daily at bedtime, Disp: , Rfl:     escitalopram (LEXAPRO) 20 mg tablet, Take 20 mg by mouth daily, Disp: , Rfl:     methocarbamol (ROBAXIN) 500 mg tablet, Take 1 tablet (500 mg total) by mouth 4 (four) times a day as needed for muscle spasms (Patient not taking: Reported on 3/26/2021), Disp: 20 tablet, Rfl: 0    ondansetron (ZOFRAN) 4 mg tablet, Take 1 tablet by mouth every 6 (six) hours as needed for nausea or vomiting, Disp: 12 tablet, Rfl: 0    Current Allergies     Allergies as of 04/11/2021    (No Known Allergies)            The following portions of the patient's history were reviewed and updated as appropriate: allergies, current medications, past family history, past medical history, past social history, past surgical history and problem list      Past Medical History:   Diagnosis Date    Hyperlipidemia     Psychiatric disorder        Past Surgical History:   Procedure Laterality Date    JOINT REPLACEMENT Right 2016    MAGDALENA    TOTAL HIP ARTHROPLASTY         History reviewed  No pertinent family history  Medications have been verified  Objective   /80   Pulse 87   Temp 97 5 °F (36 4 °C)   Ht 5' 5" (1 651 m)   Wt 107 kg (236 lb)   BMI 39 27 kg/m²   No LMP recorded  Physical Exam     Physical Exam  Vitals signs reviewed  Constitutional:       General: She is not in acute distress  Appearance: She is well-developed  HENT:      Right Ear: Hearing, tympanic membrane, ear canal and external ear normal       Left Ear: Hearing, tympanic membrane, ear canal and external ear normal       Nose: Mucosal edema (B/L boggy turbinates) and congestion present        Mouth/Throat:      Mouth: Mucous membranes are moist       Pharynx: Oropharynx is clear  Neck:      Musculoskeletal: Neck supple  Cardiovascular:      Rate and Rhythm: Normal rate and regular rhythm  Heart sounds: Normal heart sounds  No murmur  Pulmonary:      Effort: Pulmonary effort is normal  No respiratory distress  Breath sounds: Normal breath sounds  Lymphadenopathy:      Cervical: No cervical adenopathy  Neurological:      Mental Status: She is alert and oriented to person, place, and time

## 2021-04-15 ENCOUNTER — APPOINTMENT (OUTPATIENT)
Dept: PHYSICAL THERAPY | Facility: CLINIC | Age: 46
End: 2021-04-15
Payer: COMMERCIAL

## 2021-04-19 ENCOUNTER — OFFICE VISIT (OUTPATIENT)
Dept: PHYSICAL THERAPY | Facility: CLINIC | Age: 46
End: 2021-04-19
Payer: COMMERCIAL

## 2021-04-19 DIAGNOSIS — M54.41 ACUTE RIGHT-SIDED LOW BACK PAIN WITH RIGHT-SIDED SCIATICA: Primary | ICD-10-CM

## 2021-04-19 PROCEDURE — 97110 THERAPEUTIC EXERCISES: CPT | Performed by: PHYSICAL THERAPIST

## 2021-04-19 PROCEDURE — 97112 NEUROMUSCULAR REEDUCATION: CPT | Performed by: PHYSICAL THERAPIST

## 2021-04-19 NOTE — PROGRESS NOTES
Daily Note     Today's date: 2021  Patient name: Ernestina Pryor  : 1975  MRN: [id number]  Referring provider: Steven Rdz, PT  Dx:   Encounter Diagnosis     ICD-10-CM    1  Acute right-sided low back pain with right-sided sciatica  M54 41                   Subjective: Patient reports she has been very busy with working and family things the past few weeks  She reports her back remains painful but she is moving better  She reports feeling increased HS tightness the past few days  Objective: See treatment diary below      Assessment:   Stage: acute on chronic   Stability of Symptoms:  At Evaluation: stable - unchanged  Global Rating of Change:   Symptom Irritability Level: high  Primary Movement Diagnosis:   Patient Specific Functional Scale:   3/26/21: get through a 12 hour work day with less pain 3/10, perform house cleaning with less pain 3/10, return to hiking 2-3 miles 2/10; Total   FOTO Prediction: 56 by visit 12  FOTO Progress: 51 at evaluation   Greatest Concern: limited activity - hiking, cleaning house, working  Current Activity Recommendations: added HEP, desensitization techniques for (R) sided scar, speak to nutritionist to establish plan for weight loss (3/26); added to HEP ()  Current Educational Needs: progressions, nature of pain, pain does not equal harm    Patient demonstrated good form with exercises  She displayed improved control with hip abduction  She had no complaints of increased pain throughout treatment  She will continue to benefit from skilled PT to maximize strength and control to prepare for higher level activities such as 12 hour shifts at work           Plan: Continue with POC - monitor tolerance to treatment     Daily Treatment Diary     DX: acute LBP  EPOC: 21  Precautions: standard  CO-MORBIDITIES: (R) MAGDALENA  HEP ACCESS CODE: [id number]      Manual  3/30        Lumbar Mobs         Lumbar STM                                        Exercise Diary HEP 3/30 4/1 4/19      Therapeutic Exercise           Recumbent Bike  8 min 8 min 5 min                Abdominal Brace 3/26 5"x10 5"x10 5"x10      LFS: Alt LE 3/26 20x ea 20x ea 20x ea      H/L Hip ADD Isometric 3/26 5"x15 5"x20 5"x20                SLR 3/26 10x ea 10x ea 15x ea      S/L Hip ABD 3/26  10x ea 10x ea      Bridge  3/26 5"x10 5"x15 5"x15      S/L Clamshell   10x ea                  Neuromuscular Reeducation          TB Counter Balance  4/1  OTB  10x ea OTB  10x ea                                              1/2 Kneeling Chop and Lift with CC   (L) Kneel  10x                 CC Side Stepping                    TB Side Stepping          TB W Stepping                     FWD Lunge          LAT Lunge          Mini Squat                    FWD Step Up          LAT Step Up                    Therapeutic Activity                              Gait Training                        Modalities

## 2021-04-21 ENCOUNTER — OFFICE VISIT (OUTPATIENT)
Dept: PHYSICAL THERAPY | Facility: CLINIC | Age: 46
End: 2021-04-21
Payer: COMMERCIAL

## 2021-04-21 DIAGNOSIS — M54.41 ACUTE RIGHT-SIDED LOW BACK PAIN WITH RIGHT-SIDED SCIATICA: Primary | ICD-10-CM

## 2021-04-21 PROCEDURE — 97110 THERAPEUTIC EXERCISES: CPT | Performed by: PHYSICAL THERAPIST

## 2021-04-21 PROCEDURE — 97112 NEUROMUSCULAR REEDUCATION: CPT | Performed by: PHYSICAL THERAPIST

## 2021-04-21 PROCEDURE — 97140 MANUAL THERAPY 1/> REGIONS: CPT | Performed by: PHYSICAL THERAPIST

## 2021-04-21 NOTE — PROGRESS NOTES
Daily Note     Today's date: 2021  Patient name: Syl Rosenthal  : 1975  MRN: [id number]  Referring provider: Arianna Myles, PT  Dx:   Encounter Diagnosis     ICD-10-CM    1  Acute right-sided low back pain with right-sided sciatica  M54 41                   Subjective: Patient reports good tolerance to her LV  She reports she has ran out of her NSAIDs and has noticed an increase in her pain level as a result  She has been working and doing her HEP as she is able  Objective: See treatment diary below      Assessment:   Stage: acute on chronic   Stability of Symptoms:  At Evaluation: stable - unchanged  Global Rating of Change:   Symptom Irritability Level: high  Primary Movement Diagnosis: poor motor control   Patient Specific Functional Scale:   3/26/21: get through a 12 hour work day with less pain 3/10, perform house cleaning with less pain 3/10, return to hiking 2-3 miles 2/10; Total   FOTO Prediction: 56 by visit 12  FOTO Progress: 51 at evaluation   Greatest Concern: limited activity - hiking, cleaning house, working  Current Activity Recommendations: added HEP, desensitization techniques for (R) sided scar, speak to nutritionist to establish plan for weight loss (3/26); added to HEP ()  Current Educational Needs: progressions, nature of pain, pain does not equal harm    Patient had good tolerance to IASTM treatment - less pain and sensitivity following  She demonstrated improved strength - required less rest breaks with exercises today  She noted ambulation was less painful post treatment  She continues to be challenged with exercises  She is making slow progress with PT treatments           Plan: Continue with POC - monitor tolerance to treatment     Daily Treatment Diary     DX: acute LBP  EPOC: 21  Precautions: standard  CO-MORBIDITIES: (R) MAGDALENA  HEP ACCESS CODE: [id number]      Manual  3/30 4/21       Lumbar Mobs         Lumbar STM         IASTM (R) Hip   Scan   and Sweep  8 min                             Exercise Diary  HEP 3/30 4/1 4/19 4/21     Therapeutic Exercise           Recumbent Bike  8 min 8 min 5 min 6 min               Abdominal Brace 3/26 5"x10 5"x10 5"x10 5"x10      LFS: Alt LE 3/26 20x ea 20x ea 20x ea 20x ea     H/L Hip ADD Isometric 3/26 5"x15 5"x20 5"x20 5"x20               SLR 3/26 10x ea 10x ea 15x ea 2x10 ea     S/L Hip ABD 3/26  10x ea 10x ea 20x ea     Bridge  3/26 5"x10 5"x15 5"x15 5"x20     S/L Clamshell   10x ea                  Neuromuscular Reeducation          TB Counter Balance  4/1  OTB  10x ea OTB  10x ea GTB  10x ea                                             1/2 Kneeling Chop and Lift with CC   (L) Kneel  10x                 CC Side Stepping                    TB Side Stepping          TB W Stepping                     FWD Lunge          LAT Lunge          Mini Squat                    FWD Step Up          LAT Step Up                    Therapeutic Activity                              Gait Training                        Modalities

## 2021-04-27 ENCOUNTER — OFFICE VISIT (OUTPATIENT)
Dept: PHYSICAL THERAPY | Facility: CLINIC | Age: 46
End: 2021-04-27
Payer: COMMERCIAL

## 2021-04-27 DIAGNOSIS — M54.41 ACUTE RIGHT-SIDED LOW BACK PAIN WITH RIGHT-SIDED SCIATICA: Primary | ICD-10-CM

## 2021-04-27 PROCEDURE — 97110 THERAPEUTIC EXERCISES: CPT | Performed by: PHYSICAL THERAPIST

## 2021-04-27 PROCEDURE — 97112 NEUROMUSCULAR REEDUCATION: CPT | Performed by: PHYSICAL THERAPIST

## 2021-04-27 PROCEDURE — 97140 MANUAL THERAPY 1/> REGIONS: CPT | Performed by: PHYSICAL THERAPIST

## 2021-04-27 NOTE — PROGRESS NOTES
Daily Note     Today's date: 2021  Patient name: Kary Weeks  : 1975  MRN: [id number]  Referring provider: Liz Melendez, PT  Dx:   Encounter Diagnosis     ICD-10-CM    1  Acute right-sided low back pain with right-sided sciatica  M54 41                   Subjective: Patient reports good tolerance to her LV  She reports she has been very busy with working - has not been able to perform exercises as often as she would like  She notes improvement with sensitivity following IASTM treatment  Objective: See treatment diary below      Assessment:   Stage: acute on chronic   Stability of Symptoms:  At Evaluation: stable - unchanged  Global Rating of Change:   Symptom Irritability Level: high  Primary Movement Diagnosis: poor motor control   Patient Specific Functional Scale:   3/26/21: get through a 12 hour work day with less pain 3/10, perform house cleaning with less pain 3/10, return to hiking 2-3 miles 2/10; Total   FOTO Prediction: 56 by visit 12  FOTO Progress: 51 at evaluation   Greatest Concern: limited activity - hiking, cleaning house, working  Current Activity Recommendations: added HEP, desensitization techniques for (R) sided scar, speak to nutritionist to establish plan for weight loss (3/26); added to HEP ()  Current Educational Needs: progressions, nature of pain, pain does not equal harm    Patient continues to respond well to IASTM treatment  She had good tolerance to progressions with weights for LE mat-based exercises  She had slight lower back pain with lunge exercise  She was encouraged to perform HEP after work to see how she feels  Plan: Continue with POC - monitor tolerance to treatment   Monitor tolerance to work demands and HEP      Daily Treatment Diary     DX: acute LBP  EPOC: 21  Precautions: standard  CO-MORBIDITIES: (R) MAGDALENA  HEP ACCESS CODE: [id number]      Manual  3/30 4/21 4/27      Lumbar Mobs         Lumbar STM         IASTM (R) Hip   Scan   and Sweep  8 min Fan and fascial spiral   8 min                            Exercise Diary  HEP 3/30 4/1 4/19 4/21 4/27    Therapeutic Exercise           Recumbent Bike  8 min 8 min 5 min 6 min 6 min              Abdominal Brace 3/26 5"x10 5"x10 5"x10 5"x10  5"x10    LFS: Alt LE 3/26 20x ea 20x ea 20x ea 20x ea 1 5#   20x ea    H/L Hip ADD Isometric 3/26 5"x15 5"x20 5"x20 5"x20 5"x20              SLR 3/26 10x ea 10x ea 15x ea 2x10 ea 1 5#  2x10 ea    S/L Hip ABD 3/26  10x ea 10x ea 20x ea 1 5#   20x ea    Bridge  3/26 5"x10 5"x15 5"x15 5"x20 5"x20    S/L Clamshell   10x ea                  Neuromuscular Reeducation          TB Counter Balance  4/1  OTB  10x ea OTB  10x ea GTB  10x ea                                             1/2 Kneeling Chop and Lift with CC   (L) Kneel  10x                 CC Side Stepping                    TB Side Stepping          TB W Stepping                     FWD Lunge      10x ea    LAT Lunge          Mini Squat                    FWD Step Up          LAT Step Up                    Therapeutic Activity                              Gait Training                        Modalities

## 2021-04-29 ENCOUNTER — APPOINTMENT (OUTPATIENT)
Dept: PHYSICAL THERAPY | Facility: CLINIC | Age: 46
End: 2021-04-29
Payer: COMMERCIAL

## 2021-05-18 ENCOUNTER — TRANSCRIBE ORDERS (OUTPATIENT)
Dept: ADMINISTRATIVE | Facility: HOSPITAL | Age: 46
End: 2021-05-18

## 2021-05-18 ENCOUNTER — APPOINTMENT (OUTPATIENT)
Dept: LAB | Facility: HOSPITAL | Age: 46
End: 2021-05-18
Payer: COMMERCIAL

## 2021-05-18 ENCOUNTER — LAB (OUTPATIENT)
Dept: LAB | Facility: HOSPITAL | Age: 46
End: 2021-05-18
Payer: COMMERCIAL

## 2021-05-18 DIAGNOSIS — E78.5 HYPERLIPOPROTEINEMIA: ICD-10-CM

## 2021-05-18 DIAGNOSIS — Z00.8 HEALTH EXAMINATION IN POPULATION SURVEY: Primary | ICD-10-CM

## 2021-05-18 DIAGNOSIS — Z00.8 HEALTH EXAMINATION IN POPULATION SURVEY: ICD-10-CM

## 2021-05-18 DIAGNOSIS — E78.5 HYPERLIPOPROTEINEMIA: Primary | ICD-10-CM

## 2021-05-18 LAB
ALBUMIN SERPL BCP-MCNC: 3.4 G/DL (ref 3.5–5)
ALP SERPL-CCNC: 59 U/L (ref 46–116)
ALT SERPL W P-5'-P-CCNC: 22 U/L (ref 12–78)
ANION GAP SERPL CALCULATED.3IONS-SCNC: 6 MMOL/L (ref 4–13)
AST SERPL W P-5'-P-CCNC: 14 U/L (ref 5–45)
BACTERIA UR QL AUTO: ABNORMAL /HPF
BASOPHILS # BLD AUTO: 0.07 THOUSANDS/ΜL (ref 0–0.1)
BASOPHILS NFR BLD AUTO: 1 % (ref 0–1)
BILIRUB SERPL-MCNC: 0.48 MG/DL (ref 0.2–1)
BILIRUB UR QL STRIP: NEGATIVE
BUN SERPL-MCNC: 14 MG/DL (ref 5–25)
CALCIUM ALBUM COR SERPL-MCNC: 9.8 MG/DL (ref 8.3–10.1)
CALCIUM SERPL-MCNC: 9.3 MG/DL (ref 8.3–10.1)
CHLORIDE SERPL-SCNC: 104 MMOL/L (ref 100–108)
CHOLEST SERPL-MCNC: 250 MG/DL (ref 50–200)
CLARITY UR: ABNORMAL
CO2 SERPL-SCNC: 28 MMOL/L (ref 21–32)
COLOR UR: YELLOW
CREAT SERPL-MCNC: 0.76 MG/DL (ref 0.6–1.3)
EOSINOPHIL # BLD AUTO: 0.15 THOUSAND/ΜL (ref 0–0.61)
EOSINOPHIL NFR BLD AUTO: 3 % (ref 0–6)
ERYTHROCYTE [DISTWIDTH] IN BLOOD BY AUTOMATED COUNT: 12.8 % (ref 11.6–15.1)
EST. AVERAGE GLUCOSE BLD GHB EST-MCNC: 100 MG/DL
GFR SERPL CREATININE-BSD FRML MDRD: 94 ML/MIN/1.73SQ M
GLUCOSE P FAST SERPL-MCNC: 87 MG/DL (ref 65–99)
GLUCOSE UR STRIP-MCNC: NEGATIVE MG/DL
HBA1C MFR BLD: 5.1 %
HCT VFR BLD AUTO: 45.5 % (ref 34.8–46.1)
HDLC SERPL-MCNC: 78 MG/DL
HGB BLD-MCNC: 14.7 G/DL (ref 11.5–15.4)
HGB UR QL STRIP.AUTO: NEGATIVE
IMM GRANULOCYTES # BLD AUTO: 0.02 THOUSAND/UL (ref 0–0.2)
IMM GRANULOCYTES NFR BLD AUTO: 0 % (ref 0–2)
KETONES UR STRIP-MCNC: NEGATIVE MG/DL
LDLC SERPL CALC-MCNC: 160 MG/DL (ref 0–100)
LEUKOCYTE ESTERASE UR QL STRIP: ABNORMAL
LYMPHOCYTES # BLD AUTO: 1.33 THOUSANDS/ΜL (ref 0.6–4.47)
LYMPHOCYTES NFR BLD AUTO: 23 % (ref 14–44)
MCH RBC QN AUTO: 32.2 PG (ref 26.8–34.3)
MCHC RBC AUTO-ENTMCNC: 32.3 G/DL (ref 31.4–37.4)
MCV RBC AUTO: 100 FL (ref 82–98)
MONOCYTES # BLD AUTO: 0.38 THOUSAND/ΜL (ref 0.17–1.22)
MONOCYTES NFR BLD AUTO: 7 % (ref 4–12)
NEUTROPHILS # BLD AUTO: 3.85 THOUSANDS/ΜL (ref 1.85–7.62)
NEUTS SEG NFR BLD AUTO: 66 % (ref 43–75)
NITRITE UR QL STRIP: NEGATIVE
NON-SQ EPI CELLS URNS QL MICRO: ABNORMAL /HPF
NONHDLC SERPL-MCNC: 172 MG/DL
NRBC BLD AUTO-RTO: 0 /100 WBCS
PH UR STRIP.AUTO: 6.5 [PH]
PLATELET # BLD AUTO: 313 THOUSANDS/UL (ref 149–390)
PMV BLD AUTO: 10.6 FL (ref 8.9–12.7)
POTASSIUM SERPL-SCNC: 3.9 MMOL/L (ref 3.5–5.3)
PROT SERPL-MCNC: 7.5 G/DL (ref 6.4–8.2)
PROT UR STRIP-MCNC: NEGATIVE MG/DL
RBC # BLD AUTO: 4.57 MILLION/UL (ref 3.81–5.12)
RBC #/AREA URNS AUTO: ABNORMAL /HPF
SODIUM SERPL-SCNC: 138 MMOL/L (ref 136–145)
SP GR UR STRIP.AUTO: 1.02 (ref 1–1.03)
TRIGL SERPL-MCNC: 58 MG/DL
UROBILINOGEN UR QL STRIP.AUTO: 0.2 E.U./DL
WBC # BLD AUTO: 5.8 THOUSAND/UL (ref 4.31–10.16)
WBC #/AREA URNS AUTO: ABNORMAL /HPF

## 2021-05-18 PROCEDURE — 80053 COMPREHEN METABOLIC PANEL: CPT

## 2021-05-18 PROCEDURE — 80061 LIPID PANEL: CPT

## 2021-05-18 PROCEDURE — 85025 COMPLETE CBC W/AUTO DIFF WBC: CPT

## 2021-05-18 PROCEDURE — 81001 URINALYSIS AUTO W/SCOPE: CPT | Performed by: INTERNAL MEDICINE

## 2021-05-18 PROCEDURE — 36415 COLL VENOUS BLD VENIPUNCTURE: CPT

## 2021-05-18 PROCEDURE — 83036 HEMOGLOBIN GLYCOSYLATED A1C: CPT

## 2021-05-19 DIAGNOSIS — E78.00 HYPERCHOLESTEROLEMIA: ICD-10-CM

## 2021-05-19 DIAGNOSIS — N39.0 URINARY TRACT INFECTION WITHOUT HEMATURIA, SITE UNSPECIFIED: Primary | ICD-10-CM

## 2021-05-19 RX ORDER — ATORVASTATIN CALCIUM 10 MG/1
TABLET, FILM COATED ORAL
Qty: 30 TABLET | Refills: 0 | Status: SHIPPED | OUTPATIENT
Start: 2021-05-19 | End: 2021-05-29

## 2021-05-26 DIAGNOSIS — E78.00 HYPERCHOLESTEROLEMIA: ICD-10-CM

## 2021-05-29 RX ORDER — ATORVASTATIN CALCIUM 10 MG/1
TABLET, FILM COATED ORAL
Qty: 30 TABLET | Refills: 0 | Status: SHIPPED | OUTPATIENT
Start: 2021-05-29 | End: 2021-08-04 | Stop reason: SINTOL

## 2021-06-13 DIAGNOSIS — E78.00 HYPERCHOLESTEROLEMIA: ICD-10-CM

## 2021-06-19 RX ORDER — ATORVASTATIN CALCIUM 10 MG/1
TABLET, FILM COATED ORAL
Qty: 180 TABLET | Refills: 1 | OUTPATIENT
Start: 2021-06-19

## 2021-06-24 ENCOUNTER — OFFICE VISIT (OUTPATIENT)
Dept: OBGYN CLINIC | Facility: CLINIC | Age: 46
End: 2021-06-24
Payer: COMMERCIAL

## 2021-06-24 ENCOUNTER — APPOINTMENT (OUTPATIENT)
Dept: RADIOLOGY | Facility: CLINIC | Age: 46
End: 2021-06-24
Payer: COMMERCIAL

## 2021-06-24 VITALS
WEIGHT: 230 LBS | SYSTOLIC BLOOD PRESSURE: 112 MMHG | DIASTOLIC BLOOD PRESSURE: 70 MMHG | BODY MASS INDEX: 38.32 KG/M2 | HEIGHT: 65 IN

## 2021-06-24 DIAGNOSIS — M25.531 PAIN IN RIGHT WRIST: Primary | ICD-10-CM

## 2021-06-24 DIAGNOSIS — M25.531 PAIN IN RIGHT WRIST: ICD-10-CM

## 2021-06-24 DIAGNOSIS — M79.642 HAND PAIN, LEFT: ICD-10-CM

## 2021-06-24 PROCEDURE — 99243 OFF/OP CNSLTJ NEW/EST LOW 30: CPT | Performed by: ORTHOPAEDIC SURGERY

## 2021-06-24 PROCEDURE — 73130 X-RAY EXAM OF HAND: CPT

## 2021-06-24 PROCEDURE — 73110 X-RAY EXAM OF WRIST: CPT

## 2021-06-24 NOTE — PROGRESS NOTES
Assessment:     1  Pain in right wrist    2  Hand pain, left          Plan:     Problem List Items Addressed This Visit     None      Visit Diagnoses     Pain in right wrist    -  Primary    Relevant Orders    XR wrist 3+ vw right    MRI wrist right wo contrast    Ambulatory referral to Orthopedic Surgery    Hand pain, left        Relevant Orders    XR hand 3+ vw left           The patient was seen and examined today in office by Dr Gisele Noel and myself     55 YOF presented to office today for evaluation of right wrist pain  History and exam consistent with a diagnosis of a possible cyst  An MRI was ordered for further evaluation  The patient was instructed to continue to wear her thumb spica brace with activity and at night for support  The patient was given a referral to Dr Kaitlynn Lopez  The patient may follow up with the ortho office as needed  Subjective:     Patient ID: Asha Zheng is a 55 y o  female  Chief Complaint:    51-year-old female presents to office today for initial evaluation of right wrist pain  The patient has a history of bilateral carpal tunnel syndrome and tennis elbow- both treated conservatively  The patient states that approximately the 2 months ago she noticed a painful "lump" form along the radial aspect of her wrist   Patient states that the lump has progressively gotten bigger  Patient reports pain with flexion and extension of the wrist  The pain also worsens with use, such as when typing  The patient was seen at an urgent care where she was diagnosed with a possible ganglion cyst  She has been wearing a thumb spica brace with activity and at night, which she states does help with her symptoms  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  When asked where the pain is located patient points to the radial aspect of the wrist   She denies any radiation  The patient does admit to numbness and tingling in both her hands due to her history of carpal tunnel  Patient also complains of a lump with similar pain along the dorsum of the left hand that has been present for approximately a month  The patient denies any upper extremity weakness  She denies any injury or trauma  She denies fever or chills  Allergy:  No Known Allergies    Medications:  all current active meds have been reviewed    Past Medical History:  Past Medical History:   Diagnosis Date    Hyperlipidemia     Psychiatric disorder        Past Surgical History:  Past Surgical History:   Procedure Laterality Date    JOINT REPLACEMENT Right 2016    MAGDALENA    TOTAL HIP ARTHROPLASTY         Family History:  Family History   Problem Relation Age of Onset    Heart disease Father     Glaucoma Father     Diabetes Father        Social History:  Social History     Substance and Sexual Activity   Alcohol Use No     Social History     Substance and Sexual Activity   Drug Use No     Social History     Tobacco Use   Smoking Status Never Smoker   Smokeless Tobacco Never Used     Review of Systems   Constitutional: Negative for chills and fever  HENT: Negative for ear pain and sore throat  Eyes: Negative for pain and visual disturbance  Respiratory: Negative for cough and shortness of breath  Cardiovascular: Negative for chest pain and palpitations  Gastrointestinal: Negative for abdominal pain and vomiting  Genitourinary: Negative for dysuria and hematuria  Musculoskeletal: Negative for arthralgias and back pain  Lump and pain along radial aspect of the right wrist and dorsum of the left hand  Skin: Negative for color change and rash  Neurological: Negative for seizures and syncope  Objective:  BP Readings from Last 1 Encounters:   06/24/21 112/70      Wt Readings from Last 1 Encounters:   06/24/21 104 kg (230 lb)        BMI:   Estimated body mass index is 38 27 kg/m² as calculated from the following:    Height as of this encounter: 5' 5" (1 651 m)      Weight as of this encounter: 104 kg (230 lb)  BSA:   Estimated body surface area is 2 1 meters squared as calculated from the following:    Height as of this encounter: 5' 5" (1 651 m)  Weight as of this encounter: 104 kg (230 lb)  Physical Exam  Constitutional:       Appearance: Normal appearance  HENT:      Head: Normocephalic and atraumatic  Nose: Nose normal       Mouth/Throat:      Pharynx: Oropharynx is clear  Eyes:      Extraocular Movements: Extraocular movements intact  Pupils: Pupils are equal, round, and reactive to light  Cardiovascular:      Pulses: Normal pulses  Pulmonary:      Effort: Pulmonary effort is normal  No respiratory distress  Breath sounds: Normal breath sounds  Musculoskeletal:      Cervical back: Normal range of motion  Skin:     General: Skin is warm and dry  Capillary Refill: Capillary refill takes less than 2 seconds  Neurological:      General: No focal deficit present  Mental Status: She is alert and oriented to person, place, and time  Psychiatric:         Mood and Affect: Mood normal          Behavior: Behavior normal          Right Hand Exam     Tenderness   The patient is experiencing tenderness in the radial area (along first dorsal compartment)  Range of Motion   The patient has normal right wrist ROM  Muscle Strength   The patient has normal right wrist strength  : 5/5     Tests   Tinel's sign (median nerve): negative    Other   Erythema: absent  Scars: absent  Sensation: normal  Pulse: present    Comments:    No lesions, effusion, ecchymosis, erythema, warmth, or other signs of infection  There is a palpable, nonmobile, small nodule felt along the 1st dorsal compartment  Pain on the radial portion of the wrist elicited with palmar flexion, dorsiflexion, and ulnar deviation  Full range of motion present in fingers and wrist  No instability of DRUJ    Finkelstein's test elicited pain along the radial aspect of the wrist  Sensation and motor intact along the radial, medial, ulnar nerve distributions  Patient is able to make a composite fist with 5/5  strength  Strong radial pulse present  Brisk capillary refill in all fingers  Left Hand Exam   Left hand exam is normal     Tenderness   The patient is experiencing no tenderness  Range of Motion   The patient has normal left wrist ROM  Muscle Strength   The patient has normal left wrist strength  Tests   Tinel's sign (median nerve): negative    Other   Erythema: absent  Scars: absent  Sensation: normal  Pulse: present    Comments:    No lesions, effusion, ecchymosis, erythema, warmth, or other signs of infection  No tenderness to palpation  No palpable nodules or cyst felt  Full range of motion in fingers and wrist without pain  No instability of DRUJ  Patient is able to make a composite fist with 5/5  strength  Strong radial pulse present  Sensation and motor intact along the median, radial, ulnar nerve distributions  Brisk capillary refill in all fingers  I have personally reviewed pertinent films in PACS  X-rays of the right wrist taken today in office show no signs of fracture or dislocation  No bony lesions  No degenerative changes  X-rays of the left hand taken today in office show no signs of fracture dislocation  No bony lesions  No degenerative changes

## 2021-06-24 NOTE — LETTER
June 25, 2021     Unknown Ankit Garcia [address] [facility], [address] [address]    Patient: Margarette Gray   YOB: 1975   Date of Visit: 6/24/2021       Dear Dr Gisela Bundy: Thank you for referring Margarette Gray to me for evaluation  Below are my notes for this consultation  If you have questions, please do not hesitate to call me  I look forward to following your patient along with you  Sincerely,        Kelvin Dozier MD        CC: No Recipients  Cristal Aase  6/24/2021  4:51 PM  Attested  Assessment:     1  Pain in right wrist    2  Hand pain, left          Plan:     Problem List Items Addressed This Visit     None      Visit Diagnoses     Pain in right wrist    -  Primary    Relevant Orders    XR wrist 3+ vw right    MRI wrist right wo contrast    Ambulatory referral to Orthopedic Surgery    Hand pain, left        Relevant Orders    XR hand 3+ vw left           The patient was seen and examined today in office by Dr Maggie Beal and myself     55 YOF presented to office today for evaluation of right wrist pain  History and exam consistent with a diagnosis of a possible cyst  An MRI was ordered for further evaluation  The patient was instructed to continue to wear her thumb spica brace with activity and at night for support  The patient was given a referral to Dr Anjana Montgomery  The patient may follow up with the ortho office as needed  Subjective:     Patient ID: Margarette Gray is a 55 y o  female  Chief Complaint:    66-year-old female presents to office today for initial evaluation of right wrist pain  The patient has a history of bilateral carpal tunnel syndrome and tennis elbow- both treated conservatively  The patient states that approximately the 2 months ago she noticed a painful "lump" form along the radial aspect of her wrist   Patient states that the lump has progressively gotten bigger    Patient reports pain with flexion and extension of the wrist  The pain also worsens with use, such as when typing  The patient was seen at an urgent care where she was diagnosed with a possible ganglion cyst  She has been wearing a thumb spica brace with activity and at night, which she states does help with her symptoms  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  When asked where the pain is located patient points to the radial aspect of the wrist   She denies any radiation  The patient does admit to numbness and tingling in both her hands due to her history of carpal tunnel  Patient also complains of a lump with similar pain along the dorsum of the left hand that has been present for approximately a month  The patient denies any upper extremity weakness  She denies any injury or trauma  She denies fever or chills  Allergy:  No Known Allergies    Medications:  all current active meds have been reviewed    Past Medical History:  Past Medical History:   Diagnosis Date    Hyperlipidemia     Psychiatric disorder        Past Surgical History:  Past Surgical History:   Procedure Laterality Date    JOINT REPLACEMENT Right 2016    MAGDALENA    TOTAL HIP ARTHROPLASTY         Family History:  Family History   Problem Relation Age of Onset    Heart disease Father     Glaucoma Father     Diabetes Father        Social History:  Social History     Substance and Sexual Activity   Alcohol Use No     Social History     Substance and Sexual Activity   Drug Use No     Social History     Tobacco Use   Smoking Status Never Smoker   Smokeless Tobacco Never Used     Review of Systems   Constitutional: Negative for chills and fever  HENT: Negative for ear pain and sore throat  Eyes: Negative for pain and visual disturbance  Respiratory: Negative for cough and shortness of breath  Cardiovascular: Negative for chest pain and palpitations  Gastrointestinal: Negative for abdominal pain and vomiting  Genitourinary: Negative for dysuria and hematuria  Musculoskeletal: Negative for arthralgias and back pain  Lump and pain along radial aspect of the right wrist and dorsum of the left hand  Skin: Negative for color change and rash  Neurological: Negative for seizures and syncope  Objective:  BP Readings from Last 1 Encounters:   06/24/21 112/70      Wt Readings from Last 1 Encounters:   06/24/21 104 kg (230 lb)        BMI:   Estimated body mass index is 38 27 kg/m² as calculated from the following:    Height as of this encounter: 5' 5" (1 651 m)  Weight as of this encounter: 104 kg (230 lb)  BSA:   Estimated body surface area is 2 1 meters squared as calculated from the following:    Height as of this encounter: 5' 5" (1 651 m)  Weight as of this encounter: 104 kg (230 lb)  Physical Exam  Constitutional:       Appearance: Normal appearance  HENT:      Head: Normocephalic and atraumatic  Nose: Nose normal       Mouth/Throat:      Pharynx: Oropharynx is clear  Eyes:      Extraocular Movements: Extraocular movements intact  Pupils: Pupils are equal, round, and reactive to light  Cardiovascular:      Pulses: Normal pulses  Pulmonary:      Effort: Pulmonary effort is normal  No respiratory distress  Breath sounds: Normal breath sounds  Musculoskeletal:      Cervical back: Normal range of motion  Skin:     General: Skin is warm and dry  Capillary Refill: Capillary refill takes less than 2 seconds  Neurological:      General: No focal deficit present  Mental Status: She is alert and oriented to person, place, and time  Psychiatric:         Mood and Affect: Mood normal          Behavior: Behavior normal          Right Hand Exam     Tenderness   The patient is experiencing tenderness in the radial area (along first dorsal compartment)  Range of Motion   The patient has normal right wrist ROM  Muscle Strength   The patient has normal right wrist strength    : 5/5     Tests   Tinel's sign (median nerve): negative    Other   Erythema: absent  Scars: absent  Sensation: normal  Pulse: present    Comments:    No lesions, effusion, ecchymosis, erythema, warmth, or other signs of infection  There is a palpable, nonmobile, small nodule felt along the 1st dorsal compartment  Pain on the radial portion of the wrist elicited with palmar flexion, dorsiflexion, and ulnar deviation  Full range of motion present in fingers and wrist  No instability of DRUJ  Finkelstein's test elicited pain along the radial aspect of the wrist  Sensation and motor intact along the radial, medial, ulnar nerve distributions  Patient is able to make a composite fist with 5/5  strength  Strong radial pulse present  Brisk capillary refill in all fingers  Left Hand Exam   Left hand exam is normal     Tenderness   The patient is experiencing no tenderness  Range of Motion   The patient has normal left wrist ROM  Muscle Strength   The patient has normal left wrist strength  Tests   Tinel's sign (median nerve): negative    Other   Erythema: absent  Scars: absent  Sensation: normal  Pulse: present    Comments:    No lesions, effusion, ecchymosis, erythema, warmth, or other signs of infection  No tenderness to palpation  No palpable nodules or cyst felt  Full range of motion in fingers and wrist without pain  No instability of DRUJ  Patient is able to make a composite fist with 5/5  strength  Strong radial pulse present  Sensation and motor intact along the median, radial, ulnar nerve distributions  Brisk capillary refill in all fingers  I have personally reviewed pertinent films in PACS  X-rays of the right wrist taken today in office show no signs of fracture or dislocation  No bony lesions  No degenerative changes  X-rays of the left hand taken today in office show no signs of fracture dislocation  No bony lesions  No degenerative changes    Attestation signed by Cathy Clifton MD at 6/24/2021  6:23 PM:  Patient was seen and examined  I supervised the advanced practitioner  I reviewed and agree with advanced practitioner's assessment and plan

## 2021-07-04 ENCOUNTER — OFFICE VISIT (OUTPATIENT)
Dept: URGENT CARE | Facility: CLINIC | Age: 46
End: 2021-07-04
Payer: COMMERCIAL

## 2021-07-04 DIAGNOSIS — H69.82 ACUTE DYSFUNCTION OF EUSTACHIAN TUBE, LEFT: Primary | ICD-10-CM

## 2021-07-04 PROCEDURE — G0382 LEV 3 HOSP TYPE B ED VISIT: HCPCS | Performed by: PHYSICIAN ASSISTANT

## 2021-07-04 RX ORDER — METHYLPREDNISOLONE 4 MG/1
TABLET ORAL
Qty: 21 EACH | Refills: 0 | Status: SHIPPED | OUTPATIENT
Start: 2021-07-04 | End: 2021-07-10

## 2021-07-04 NOTE — PATIENT INSTRUCTIONS
Eustachian Tube Dysfunction   AMBULATORY CARE:   Eustachian tube dysfunction (ETD)  is a condition that prevents your eustachian tubes from opening properly  It can also cause them to become blocked  Eustachian tubes connect your middle ear to the back of your nose and throat  These tubes open and allow air to flow in and out when you sneeze, swallow, or yawn  Common signs and symptoms include the following:   · Fullness or pressure in your ears    · Muffled hearing, or a feeling you are hearing under water or have clogged ears    · Pain in one or both ears    · Ringing in your ears    · Popping, crackling, or clicking feeling in your ears    · Trouble keeping your balance    Call your doctor or otolaryngologist if:   · Your symptoms do not improve or get worse  · You have a fever  · You have any hearing loss  · You have questions or concerns about your condition or care  Treatment:  ETD may get better on its own without any treatment  If it continues, you may need any of the following:  · Swallow, yawn, or chew gum  to help open your eustachian tubes  Your healthcare provider may also recommend you blow with your mouth shut and your nostrils pinched closed  · Air pressure devices  push air into your nose and eustachian tubes to help relieve air pressure in your ear  · Treatment for allergies  such as decongestants, antihistamines, and nasal steroids may improve ETD  They may help decrease swelling of the eustachian tubes  · A myringotomy  is surgery to make a hole in your eardrum  The hole relieves pressure and lets fluid drain from your ear  A pressure equalizing (PE) tube may be used to keep the hole open and to help drain fluid  · Tuboplasty  is a procedure to widen your eustachian tubes  Follow up with your doctor or otolaryngologist as directed:  Write down your questions so you remember to ask them during your visits    © Copyright [employer] 2020 Information is for End User's use only and may not be sold, redistributed or otherwise used for commercial purposes  All illustrations and images included in CareNotes® are the copyrighted property of A D A M , Inc  or Yenny Huizar  The above information is an  only  It is not intended as medical advice for individual conditions or treatments  Talk to your doctor, nurse or pharmacist before following any medical regimen to see if it is safe and effective for you

## 2021-07-12 NOTE — PROGRESS NOTES
[facility]        NAME: Briseida Liang is a 55 y o  female  : 1975    MRN: [id number]  DATE:  2021  TIME: 10:22 AM    Assessment and Plan   Acute dysfunction of Eustachian tube, left [H69 82]  1  Acute dysfunction of Eustachian tube, left  methylPREDNISolone 4 MG tablet therapy pack         Patient Instructions      discussed condition with patient  She has acute eustachian tube dysfunction of the left ear  I will prescribe her a Medrol Dosepak and recommended Flonase, decongestants, and should be re-evaluated if condition does not significantly improve over the next week  Follow up with PCP in 3-5 days  Proceed to  ER if symptoms worsen  Chief Complaint     Chief Complaint   Patient presents with   Kaleigh Lopez      left         History of Present Illness         Patient presents recent onset of pain and pressure in her left ear  She has had some issues with congestion and allergies  She has tried [place] Maricel over-the-counter without success  Denies otorrhea, tinnitus, hearing loss, vertigo, or any other significant symptoms  Denies any symptoms in the right ear  Review of Systems   Review of Systems   Constitutional: Negative  HENT: Positive for congestion and ear pain (  Left)  Negative for ear discharge, hearing loss and tinnitus  Respiratory: Negative  Cardiovascular: Negative  Gastrointestinal: Negative  Genitourinary: Negative  Neurological: Negative for dizziness           Current Medications       Current Outpatient Medications:     aspirin (ECOTRIN LOW STRENGTH) 81 mg EC tablet, Take 81 mg by mouth daily, Disp: , Rfl:     atorvastatin (LIPITOR) 10 mg tablet, TAKE 1 TABLET BY MOUTH EVERY OTHER DAY (Patient not taking: Reported on 2021), Disp: 30 tablet, Rfl: 0    diflunisal (DOLOBID) 500 mg tablet, Take 500 mg by mouth 2 (two) times a day, Disp: , Rfl:     DULoxetine (CYMBALTA) 60 mg delayed release capsule, Take 60 mg by mouth daily, Disp: , Rfl:     escitalopram (LEXAPRO) 20 mg tablet, Take 20 mg by mouth daily, Disp: , Rfl:     methocarbamol (ROBAXIN) 500 mg tablet, Take 1 tablet (500 mg total) by mouth 4 (four) times a day as needed for muscle spasms (Patient not taking: Reported on 3/26/2021), Disp: 20 tablet, Rfl: 0    ondansetron (ZOFRAN) 4 mg tablet, Take 1 tablet by mouth every 6 (six) hours as needed for nausea or vomiting, Disp: 12 tablet, Rfl: 0    traMADol (ULTRAM) 50 mg tablet, Take 50 mg by mouth every 6 (six) hours as needed for moderate pain, Disp: , Rfl:     traZODone (DESYREL) 100 mg tablet, Take 50 mg by mouth daily at bedtime, Disp: , Rfl:     Current Allergies     Allergies as of 07/04/2021    (No Known Allergies)            The following portions of the patient's history were reviewed and updated as appropriate: allergies, current medications, past family history, past medical history, past social history, past surgical history and problem list      Past Medical History:   Diagnosis Date    Hyperlipidemia     Psychiatric disorder        Past Surgical History:   Procedure Laterality Date    JOINT REPLACEMENT Right 2016    MAGDALENA    TOTAL HIP ARTHROPLASTY         Family History   Problem Relation Age of Onset    Heart disease Father     Glaucoma Father     Diabetes Father          Medications have been verified  Objective   There were no vitals taken for this visit  No LMP recorded  Physical Exam     Physical Exam  Vitals reviewed  Constitutional:       General: She is not in acute distress  Appearance: She is well-developed  HENT:      Right Ear: Hearing, tympanic membrane, ear canal and external ear normal       Left Ear: Hearing, ear canal and external ear normal       Ears:      Comments: Left TM dull     Nose: Nose normal       Mouth/Throat:      Mouth: Mucous membranes are moist       Pharynx: Oropharynx is clear     Neurological:      Mental Status: She is alert and oriented to person, place, and time

## 2021-07-15 ENCOUNTER — HOSPITAL ENCOUNTER (OUTPATIENT)
Dept: MRI IMAGING | Facility: HOSPITAL | Age: 46
Discharge: HOME | End: 2021-07-15
Payer: COMMERCIAL

## 2021-07-15 DIAGNOSIS — M25.531 PAIN IN RIGHT WRIST: ICD-10-CM

## 2021-07-15 PROCEDURE — 73221 MRI JOINT UPR EXTREM W/O DYE: CPT

## 2021-07-15 PROCEDURE — G1004 CDSM NDSC: HCPCS

## 2021-08-04 ENCOUNTER — APPOINTMENT (OUTPATIENT)
Dept: RADIOLOGY | Facility: CLINIC | Age: 46
End: 2021-08-04
Payer: COMMERCIAL

## 2021-08-04 ENCOUNTER — HOSPITAL ENCOUNTER (OUTPATIENT)
Dept: MAMMOGRAPHY | Facility: CLINIC | Age: 46
Discharge: HOME | End: 2021-08-04
Payer: COMMERCIAL

## 2021-08-04 ENCOUNTER — CONSULT (OUTPATIENT)
Dept: PAIN MEDICINE | Facility: CLINIC | Age: 46
End: 2021-08-04
Payer: COMMERCIAL

## 2021-08-04 VITALS
BODY MASS INDEX: 38.49 KG/M2 | TEMPERATURE: 98.7 F | DIASTOLIC BLOOD PRESSURE: 86 MMHG | SYSTOLIC BLOOD PRESSURE: 126 MMHG | WEIGHT: 231 LBS | HEIGHT: 65 IN

## 2021-08-04 VITALS — BODY MASS INDEX: 38.49 KG/M2 | WEIGHT: 231 LBS | HEIGHT: 65 IN

## 2021-08-04 DIAGNOSIS — E66.01 MORBID OBESITY (HCC): ICD-10-CM

## 2021-08-04 DIAGNOSIS — G89.29 CHRONIC RIGHT-SIDED LOW BACK PAIN WITH RIGHT-SIDED SCIATICA: ICD-10-CM

## 2021-08-04 DIAGNOSIS — Z12.31 VISIT FOR SCREENING MAMMOGRAM: ICD-10-CM

## 2021-08-04 DIAGNOSIS — G89.29 CHRONIC RIGHT-SIDED LOW BACK PAIN WITH RIGHT-SIDED SCIATICA: Primary | ICD-10-CM

## 2021-08-04 DIAGNOSIS — Z12.31 ENCOUNTER FOR SCREENING MAMMOGRAM FOR MALIGNANT NEOPLASM OF BREAST: ICD-10-CM

## 2021-08-04 DIAGNOSIS — M54.41 CHRONIC RIGHT-SIDED LOW BACK PAIN WITH RIGHT-SIDED SCIATICA: Primary | ICD-10-CM

## 2021-08-04 DIAGNOSIS — M12.9 ARTHROPATHY: ICD-10-CM

## 2021-08-04 DIAGNOSIS — M54.41 CHRONIC RIGHT-SIDED LOW BACK PAIN WITH RIGHT-SIDED SCIATICA: ICD-10-CM

## 2021-08-04 PROBLEM — F41.9 ANXIETY DISORDER: Status: ACTIVE | Noted: 2021-08-04

## 2021-08-04 PROBLEM — M54.50 CHRONIC LOW BACK PAIN: Status: ACTIVE | Noted: 2021-08-04

## 2021-08-04 PROBLEM — M54.9 BACKACHE: Status: ACTIVE | Noted: 2021-08-04

## 2021-08-04 PROBLEM — K58.9 IRRITABLE BOWEL SYNDROME: Status: ACTIVE | Noted: 2021-08-04

## 2021-08-04 PROBLEM — F32.5 MAJOR DEPRESSION, SINGLE EPISODE, IN COMPLETE REMISSION (HCC): Status: ACTIVE | Noted: 2021-08-04

## 2021-08-04 PROBLEM — E78.5 HYPERLIPIDEMIA: Status: ACTIVE | Noted: 2021-08-04

## 2021-08-04 PROCEDURE — 99244 OFF/OP CNSLTJ NEW/EST MOD 40: CPT | Performed by: ANESTHESIOLOGY

## 2021-08-04 PROCEDURE — 77067 SCR MAMMO BI INCL CAD: CPT

## 2021-08-04 PROCEDURE — 77063 BREAST TOMOSYNTHESIS BI: CPT

## 2021-08-04 PROCEDURE — 72110 X-RAY EXAM L-2 SPINE 4/>VWS: CPT

## 2021-08-04 NOTE — PATIENT INSTRUCTIONS
Lumbar Radiculopathy   WHAT YOU NEED TO KNOW:   What is lumbar radiculopathy? Lumbar radiculopathy is a painful condition that happens when a nerve in your lumbar spine (lower back) is pinched or irritated  Nerves control feeling and movement in your body  What causes lumbar radiculopathy? You may get a pinched nerve in your lumbar spine if you have disc damage  Discs are natural, spongy cushions between your vertebrae (back bones) that allow your spine to move  Your discs may move out of place and pinch the nerve in your spine  Your risk for a pinched nerve and lumbar radiculopathy increases if:  · You smoke  · You have diabetes, a spinal infection, or a growth in your spine  · You are overweight  · You are male  · You are elderly  What are the signs and symptoms of lumbar radiculopathy? You may have any of the following:  · Pain that moves from your lower back to your buttocks, groin, and the back of your leg  The pain is often felt below your knee  Your pain may worsen when you cough, sneeze, stand, or sit  · Numbness, weakness, or tingling in your back or legs  How is lumbar radiculopathy diagnosed? Your healthcare provider will examine you and ask about your family history of back and leg pain  He may also test you for weakness, numbness, or tingling in your back, buttocks, and legs  Your healthcare provider may ask you to lie on your back and lift your leg to locate your pain  You may have any of the following:  · Blood tests: You may need blood taken to give healthcare providers information about how your body is working  The blood may be taken from your hand, arm, or IV  · Magnetic resonance imaging (MRI): An MRI machine is used to take a picture of your lower back  Your healthcare provider will use this picture to check for problems and changes in your back bones, nerves, and discs  You will need to lie still during this test  The MRI machine contains a very powerful magnet  Never enter the MRI room with any metal objects  This can cause serious injury  Tell your healthcare provider if you have any metal implants in your body  · X-ray: An x-ray is a picture of your lower back  A lumbar x-ray may show signs of infection or other problems with your spine  · An electromyography (EMG)  test measures the electrical activity of your muscles at rest and with movement  · Computed tomography (CT) scan: A special x-ray machine uses a computer to take pictures of your lower back  It may be used to look at your bones, discs, and nerves  You may be given dye in your IV to help improve the pictures  Tell your healthcare provider if you are allergic to shellfish, or have other allergies or medical conditions  People who are allergic to iodine or shellfish (lobster, crab, or shrimp) may be allergic to some dyes  How is lumbar radiculopathy treated? Treatment of lumbar radiculopathy may reduce pain and swelling, and improve your ability to walk and do your normal activities  Ask your healthcare provider for more information about these and other treatments for lumbar radiculopathy:  · Medicines:     ? NSAIDs , such as ibuprofen, help decrease swelling, pain, and fever  This medicine is available with or without a doctor's order  NSAIDs can cause stomach bleeding or kidney problems in certain people  If you take blood thinner medicine, always ask your healthcare provider if NSAIDs are safe for you  Always read the medicine label and follow directions  ? Muscle relaxers  help decrease pain and muscle spasms  ? Opioids: This is a strong medicine given to reduce severe pain  It is also called narcotic pain medicine  Take this medicine exactly as directed by your healthcare provider  ? Oral steroids: Steroids may be given to reduce swelling and pain  ? Steroid injections: Healthcare providers may give you steroid medicine through a needle (shot) into your lumbar spine   This may help decrease your nerve pain and swelling  You may need more than 1 injection if your symptoms do not improve after the first treatment  · Physical therapy: Your healthcare provider may suggest physical therapy  Your physical therapist may teach you certain exercises to improve posture (the way you stand and sit), flexibility, and strength in your lower back  He may also teach you how to remain safely active and avoid further injury  Follow the exercise plan given to you by your physical therapist     · Transcutaneous electrical nerve stimulation: This treatment, called TENS, stimulates your nerves and may decrease your pain  Wires are attached to pads  The pads are attached to your skin  The wires send a mild current through your nerves  · Surgery: You may need surgery to relieve your pinched nerve if your condition has not improved within 4 to 6 weeks  You may also need it if you have lumbar radiculopathy more than once  What are the risks of treatment for lumbar radiculopathy? · Without treatment, your pain may worsen  The pinched and swollen nerve may lead to problems when you walk or move  In severe cases, you may lose control of your urine or bowel movements  Bedrest can make your symptoms worse  · Pain medicines can cause vomiting, upset stomach, constipation (large, hard bowel movements that are difficult to pass), or kidney or liver problems  Opioid medicine may be addictive (hard to quit taking once you start)  Oral steroids and steroid injections may have side effects, such as facial redness, fluid retention (water weight gain), and mood changes  Steroid injections may be painful and can cause severe headaches, infections, allergic reactions, or nerve damage  Surgery risks include delayed problems with healing, spinal or bladder infection, damage to the spinal cord or other nerves, and ongoing pain  In rare cases, you could become paralyzed (not able to move your arms or legs)      How can I care for myself when I have lumbar radiculopathy? · Stay active: It is best to be active when you have lumbar radiculopathy  Your healthcare provider may tell you to take walks to ease yourself back into your daily routine  Avoid long periods of bed rest  Bed rest could worsen your symptoms  Do not move in ways that increase your pain  Ask your healthcare provider for more information about the best ways to stay active  · Use ice or heat packs:  Use ice or heat packs on the sore area of your body to decrease the pain and swelling  Put ice in a plastic bag covered with a towel on your low back  Cover heated items with a towel to avoid burns  Use ice and heat as directed  · Avoid heavy lifting: Your condition may worsen if you lift heavy things  Avoid lifting if possible  · Maintain a healthy weight:  Excess body weight may strain your back  Talk with your healthcare provider about ways to lose excess weight if you are overweight  When should I contact my healthcare provider? · Your pain does not improve within 1 to 3 weeks after treatment  · Your pain and weakness keep you from your normal activities at work, home, or school  · You lose more than 10 pounds in 6 months without trying  · You become depressed or sad because of the pain  · You have questions or concerns about your condition or care  When should I seek immediate care or call 911? · You have a fever greater than 100 4°F for longer than 2 days  · You have new, severe back or leg pain, or your pain spreads to both legs  · You have any new signs of numbness or weakness, especially in your lower back, legs, arms, or genital area  · You have new trouble controlling your urine and bowel movements  · You do not feel like your bladder empties when you urinate  CARE AGREEMENT:   You have the right to help plan your care  Learn about your health condition and how it may be treated   Discuss treatment options with your healthcare providers to decide what care you want to receive  You always have the right to refuse treatment  The above information is an  only  It is not intended as medical advice for individual conditions or treatments  Talk to your doctor, nurse or pharmacist before following any medical regimen to see if it is safe and effective for you  © Copyright [employer] 2021 Information is for End User's use only and may not be sold, redistributed or otherwise used for commercial purposes   All illustrations and images included in CareNotes® are the copyrighted property of A D A M , Inc  or [address] [place] [address]
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
## 2021-08-07 ENCOUNTER — APPOINTMENT (OUTPATIENT)
Dept: RADIOLOGY | Facility: CLINIC | Age: 46
End: 2021-08-07
Payer: COMMERCIAL

## 2021-08-07 DIAGNOSIS — M79.672 LEFT FOOT PAIN: ICD-10-CM

## 2021-08-07 PROCEDURE — 73650 X-RAY EXAM OF HEEL: CPT

## 2021-08-16 ENCOUNTER — OFFICE VISIT (OUTPATIENT)
Dept: OBGYN CLINIC | Facility: CLINIC | Age: 46
End: 2021-08-16
Payer: COMMERCIAL

## 2021-08-16 VITALS
BODY MASS INDEX: 38.69 KG/M2 | DIASTOLIC BLOOD PRESSURE: 88 MMHG | WEIGHT: 232.2 LBS | SYSTOLIC BLOOD PRESSURE: 118 MMHG | HEIGHT: 65 IN

## 2021-08-16 DIAGNOSIS — G56.31 RADIAL TUNNEL SYNDROME OF RIGHT UPPER EXTREMITY: ICD-10-CM

## 2021-08-16 DIAGNOSIS — G56.02 CARPAL TUNNEL SYNDROME ON LEFT: ICD-10-CM

## 2021-08-16 DIAGNOSIS — G56.01 CARPAL TUNNEL SYNDROME ON RIGHT: Primary | ICD-10-CM

## 2021-08-16 DIAGNOSIS — M67.431 GANGLION CYST OF VOLAR ASPECT OF RIGHT WRIST: ICD-10-CM

## 2021-08-16 PROCEDURE — 99244 OFF/OP CNSLTJ NEW/EST MOD 40: CPT | Performed by: ORTHOPAEDIC SURGERY

## 2021-08-16 NOTE — PROGRESS NOTES
ASSESSMENT/PLAN:    Assessment:   Left Carpal Tunnel Syndrome  Right Carpal Tunnel Syndrome  Right Radial Tunnel Syndrome  Right Wrist Radiocarpal Joint Volar Ganglion cyst    Plan:   Obtain Right wrist ultrasound to evaluate carpal tunnel  Start therapy for Right radial tunnel  Follow up after US to discuss results and potentially schedule surgery for excision of ganglion cyst and ECTR if indicated    Follow Up: After Testing    General Discussions:  Carpal Tunnel Syndrome: The anatomy and physiology of carpal tunnel syndrome was discussed with the patient today  Increase pressure localized under the transverse carpal ligament can cause pain, numbness, tingling, or dysesthesias within the median nerve distribution as well as feelings of fatigue, clumsiness, or awkwardness  These symptoms typically occur at night and worse in the morning upon waking  Eventually, untreated carpal tunnel syndrome can result in weakness and permanent loss of muscle within the thenar compartment of the hand  Treatment options were discussed with the patient  Conservative treatment includes nocturnal resting splints to keep the nerve in a neutral position, ergonomic changes within the work or home environment, activity modification, and tendon gliding exercises  Steroid injections within the carpal canal can help a majority of patients, however this is often self-limited in a majority of patients  Surgical intervention to divide the transverse carpal ligament typically results in a long-lasting relief of the patient's complaints, with the recurrence rate of less than 1%  Ganglion Cysts: The anatomy and physiology of the ganglion was discussed with the patient today in the office  Fluid leaking out of the joint surface typically creates a small sac, which can enlarge and cause pain or limitation of motion  Treatment options include observation, aspiration, or surgical incision were discussed with the patient today  Observation typically lead to resolution and approximately 10% of patients, aspiration least resolution approximately 50% of patients, and surgical excision lead to resolution in approximately 97% of patients  After discussion with the patient today, the patient voiced understanding of all treatment options  _____________________________________________________  CHIEF COMPLAINT:  Chief Complaint   Patient presents with    Right Wrist - Pain         SUBJECTIVE:  Zaid Turner is a 55 y o  female who presents with pain localized to the right wrist and right elbow  This started >4 month(s) ago as Insidious      Radiation: Yes to the  index finger, long finger, thumb and forearm  Previous Treatments: bracing and heat without relief  Associated symptoms: Numbness  Handedness: right  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    PAST MEDICAL HISTORY:  Past Medical History:   Diagnosis Date    Hyperlipidemia     Psychiatric disorder        PAST SURGICAL HISTORY:  Past Surgical History:   Procedure Laterality Date    JOINT REPLACEMENT Right 2016    MAGDALENA    TOTAL HIP ARTHROPLASTY         FAMILY HISTORY:  Family History   Problem Relation Age of Onset    Heart disease Father     Glaucoma Father     Diabetes Father     No Known Problems Mother     No Known Problems Maternal Grandmother     No Known Problems Maternal Grandfather     No Known Problems Paternal Grandmother     No Known Problems Paternal Grandfather     No Known Problems Half-Sister     No Known Problems Maternal Aunt     No Known Problems Maternal Aunt        SOCIAL HISTORY:  Social History     Tobacco Use    Smoking status: Never Smoker    Smokeless tobacco: Never Used   Substance Use Topics    Alcohol use: No    Drug use: No       MEDICATIONS:    Current Outpatient Medications:     aspirin (ECOTRIN LOW STRENGTH) 81 mg EC tablet, Take 81 mg by mouth daily, Disp: , Rfl:     diflunisal (DOLOBID) 500 mg tablet, Take 500 mg by mouth 2 (two) times a day, Disp: , Rfl:     DULoxetine (CYMBALTA) 60 mg delayed release capsule, Take 60 mg by mouth daily, Disp: , Rfl:     traMADol (ULTRAM) 50 mg tablet, Take 50 mg by mouth every 6 (six) hours as needed for moderate pain, Disp: , Rfl:     traZODone (DESYREL) 100 mg tablet, Take 50 mg by mouth daily at bedtime, Disp: , Rfl:     ALLERGIES:  No Known Allergies    REVIEW OF SYSTEMS:  Pertinent items are noted in HPI  A comprehensive review of systems was negative  LABS:  HgA1c:   Lab Results   Component Value Date    HGBA1C 5 1 05/18/2021     BMP:   Lab Results   Component Value Date    CALCIUM 9 3 05/18/2021    K 3 9 05/18/2021    CO2 28 05/18/2021     05/18/2021    BUN 14 05/18/2021    CREATININE 0 76 05/18/2021         _____________________________________________________  PHYSICAL EXAMINATION:  Vital signs: /88   Ht 5' 5" (1 651 m)   Wt 105 kg (232 lb 3 2 oz)   LMP 07/27/2021 Comment: denies pregnancy  BMI 38 64 kg/m²   General: well developed and well nourished, alert, oriented times 3 and appears comfortable  Psychiatric: Normal  HEENT: Trachea Midline, No torticollis  Cardiovascular: No discernable arrhythmia  Pulmonary: No wheezing or stridor  Abdomen: No rebound or guarding  Extremities: No peripheral edema  Skin: No Erythema, No Lacerations, No Ulcerations  Neurovascular: Sensation Intact to the Median, Ulnar, Radial Nerve, Motor Intact to the Median, Ulnar, Radial Nerve and Pulses Intact    MUSCULOSKELETAL EXAMINATION:  Left carpal tunnel: +Tinels and +Derkin's compression   5/5 APB, no atrophy  RIGHT SIDE:  Carpal tunnel:  No weakness APB, No atrophy thenar muscles, Postive Tinel's and Positive Derkin's Compression Test, Epicondylitis: Positive tenderness Laterally, Positive pain on passive wrist extension, Positive pain on resisted wrist extension and Positive tenderness over radial tunnel and Wrist:  1x1cm mobile mass over volar radial wrist  Negative Reagan's test    _____________________________________________________  STUDIES REVIEWED:  Images were reviewed in PACS by Dr Chapin Bourne and demonstrate:   Xrays right wrist 3 views AP, lateral and oblique show no evidence for significant degenerative changes, no fractures  Xrays left hand 3 views AP, lateral and oblique show no evidence fo significant degenerative changes, no fractures  MRI Right wrist wo contrast show a ganglion cyst in the volar radiocarpal joint      PROCEDURES PERFORMED:  Procedures  No Procedures performed today          I interviewed, took the history and examined the patient  I discuss the case with the resident and reviewed the resident's note  I supervised the resident and I agree with the resident management plan as it was presented to me  I was present in the clinic and examined the patient

## 2021-08-23 ENCOUNTER — HOSPITAL ENCOUNTER (OUTPATIENT)
Dept: MRI IMAGING | Facility: HOSPITAL | Age: 46
Discharge: HOME | End: 2021-08-23
Attending: ANESTHESIOLOGY
Payer: COMMERCIAL

## 2021-08-23 DIAGNOSIS — M54.41 CHRONIC RIGHT-SIDED LOW BACK PAIN WITH RIGHT-SIDED SCIATICA: ICD-10-CM

## 2021-08-23 DIAGNOSIS — G89.29 CHRONIC RIGHT-SIDED LOW BACK PAIN WITH RIGHT-SIDED SCIATICA: ICD-10-CM

## 2021-08-23 PROCEDURE — 72148 MRI LUMBAR SPINE W/O DYE: CPT

## 2021-08-23 PROCEDURE — G1004 CDSM NDSC: HCPCS

## 2021-08-25 ENCOUNTER — EVALUATION (OUTPATIENT)
Dept: OCCUPATIONAL THERAPY | Facility: CLINIC | Age: 46
End: 2021-08-25
Payer: COMMERCIAL

## 2021-08-25 ENCOUNTER — OFFICE VISIT (OUTPATIENT)
Dept: URGENT CARE | Facility: CLINIC | Age: 46
End: 2021-08-25
Payer: COMMERCIAL

## 2021-08-25 VITALS
OXYGEN SATURATION: 99 % | RESPIRATION RATE: 16 BRPM | HEART RATE: 76 BPM | TEMPERATURE: 97.9 F | DIASTOLIC BLOOD PRESSURE: 80 MMHG | SYSTOLIC BLOOD PRESSURE: 120 MMHG

## 2021-08-25 DIAGNOSIS — M25.531 RIGHT WRIST PAIN: Primary | ICD-10-CM

## 2021-08-25 DIAGNOSIS — G56.31 RADIAL TUNNEL SYNDROME OF RIGHT UPPER EXTREMITY: ICD-10-CM

## 2021-08-25 DIAGNOSIS — G56.01 CARPAL TUNNEL SYNDROME ON RIGHT: ICD-10-CM

## 2021-08-25 PROCEDURE — G0382 LEV 3 HOSP TYPE B ED VISIT: HCPCS | Performed by: PHYSICIAN ASSISTANT

## 2021-08-25 PROCEDURE — 97165 OT EVAL LOW COMPLEX 30 MIN: CPT | Performed by: OCCUPATIONAL THERAPIST

## 2021-08-25 PROCEDURE — 97760 ORTHOTIC MGMT&TRAING 1ST ENC: CPT | Performed by: OCCUPATIONAL THERAPIST

## 2021-08-25 PROCEDURE — 97140 MANUAL THERAPY 1/> REGIONS: CPT | Performed by: OCCUPATIONAL THERAPIST

## 2021-08-25 NOTE — PROGRESS NOTES
[facility]        NAME: Kim Goodson is a 55 y o  female  : 1975    MRN: [id number]  DATE: 2021  TIME: 3:38 PM    Assessment and Plan   Right wrist pain [M25 531]  1  Right wrist pain           Patient Instructions       Follow up with PCP in 3-5 days  Proceed to  ER if symptoms worsen  Chief Complaint     Chief Complaint   Patient presents with    Wrist Pain     Pt reports increased right wrist pain today after attending OT  History of Present Illness         63-year-old female presents the clinic with right wrist pain that worsened today  Patient is currently being seen by Orthopedics for radial tunnel syndrome of right upper extremity and ganglion cyst of volar aspect of right wrist and is currently attending OT  Patient had an OT session today and noted increased pain at her wrist   Patient has pain with movement but denies any numbness, tingling, weakness to extremity  Review of Systems   Review of Systems   Constitutional: Negative for chills and fever  Musculoskeletal: Positive for myalgias  Negative for arthralgias and joint swelling  Skin: Negative for color change, rash and wound  Neurological: Negative for weakness and numbness           Current Medications       Current Outpatient Medications:     aspirin (ECOTRIN LOW STRENGTH) 81 mg EC tablet, Take 81 mg by mouth daily, Disp: , Rfl:     diflunisal (DOLOBID) 500 mg tablet, Take 500 mg by mouth 2 (two) times a day, Disp: , Rfl:     DULoxetine (CYMBALTA) 60 mg delayed release capsule, Take 60 mg by mouth daily, Disp: , Rfl:     traMADol (ULTRAM) 50 mg tablet, Take 50 mg by mouth every 6 (six) hours as needed for moderate pain, Disp: , Rfl:     traZODone (DESYREL) 100 mg tablet, Take 50 mg by mouth daily at bedtime, Disp: , Rfl:     Current Allergies     Allergies as of 2021    (No Known Allergies)            The following portions of the patient's history were reviewed and updated as appropriate: allergies, current medications, past family history, past medical history, past social history, past surgical history and problem list      Past Medical History:   Diagnosis Date    Arthritis     Carpal tunnel syndrome     Heel spur     Hyperlipidemia     Psychiatric disorder     Radial tunnel syndrome        Past Surgical History:   Procedure Laterality Date    CORE DECOMPRESSION HIP Right     JOINT REPLACEMENT Right 2016    MAGDALENA    TOTAL HIP ARTHROPLASTY         Family History   Problem Relation Age of Onset    Heart disease Father     Glaucoma Father     Diabetes Father     No Known Problems Mother     No Known Problems Maternal Grandmother     No Known Problems Maternal Grandfather     No Known Problems Paternal Grandmother     No Known Problems Paternal Grandfather     No Known Problems Half-Sister     No Known Problems Maternal Aunt     No Known Problems Maternal Aunt          Medications have been verified  Objective   /80   Pulse 76   Temp 97 9 °F (36 6 °C)   Resp 16   LMP 07/27/2021 Comment: denies pregnancy  SpO2 99%   Patient's last menstrual period was 07/27/2021  Physical Exam     Physical Exam  Vitals and nursing note reviewed  Constitutional:       General: She is not in acute distress  Appearance: She is well-developed  She is not diaphoretic  HENT:      Head: Normocephalic and atraumatic  Cardiovascular:      Pulses: Normal pulses  Pulmonary:      Effort: Pulmonary effort is normal    Musculoskeletal:         General: Normal range of motion  Right wrist: Tenderness present  No swelling, deformity, effusion, bony tenderness, snuff box tenderness or crepitus  Normal range of motion  Normal pulse  Comments:   Wrist brace provided to help with pain  Skin:     General: Skin is warm and dry  Capillary Refill: Capillary refill takes less than 2 seconds     Neurological:      Mental Status: She is alert and oriented to person, place, and time

## 2021-08-25 NOTE — PROGRESS NOTES
OT Evaluation     Today's date: 2021  Patient name: Ghulam Quiroz  : 1975  MRN: [id number]  Referring provider: Sirena Interiano MD  Dx:   Encounter Diagnosis     ICD-10-CM    1  Carpal tunnel syndrome on right  G56 01 Ambulatory referral to PT/OT hand therapy   2  Radial tunnel syndrome of right upper extremity  G56 31 Ambulatory referral to PT/OT hand therapy                  Assessment  Assessment details: Asiya Tenorio presents with B UE pain she has had for > 1yr  She was seen by Dr Chapin Bourne and was dx with R CTS , radial tunnel and wrist ganglion  She also has L CTS   She has + provocative tests   She has + neural tension   She has good strength with the exception of ER  She has good sensation  She is limited with ADL for lift , carry, push , pull, self care   She has been wearing counter force strap that I believe is making her radial tunnel worse   She works as  Nurse   She lives alone     Impairments: activity intolerance, lacks appropriate home exercise program, pain with function and poor body mechanics    Symptom irritability: lowUnderstanding of Dx/Px/POC: good   Prognosis: good    Goals  STG- 1 wk  1- I with HEP  2- improve worst pain to 5/10  3- modify lifting  LTG- 6 wks  1- No pain with activity  2- I ADL- self care   3- meet expected FOTO score    Plan  Patient would benefit from: OT eval, skilled occupational therapy and custom splinting  Planned modality interventions: cryotherapy and thermotherapy: hydrocollator packs  Planned therapy interventions: orthotic fitting/training, activity modification, joint mobilization, manual therapy, massage, therapeutic activities, stretching, strengthening, home exercise program, functional ROM exercises and graded activity  Frequency: 2x week  Duration in weeks: 6  Plan of Care beginning date: 2021  Treatment plan discussed with: patient        Subjective Evaluation    History of Present Illness  Date of onset: 2020  Mechanism of injury: Pt reports onset of R elbow pain over year   She was treated with prendisone   She also has CTS  B and a volar ganglion  She will be having surgery on the r wrist  She is wearing a counter force strap on the R elbow   She wears wrist splints    Quality of life: fair    Pain  Current pain ratin  At best pain ratin  At worst pain rating: 10  Location: R elbow - radial tunnel  Quality: dull ache and sharp  Relieving factors: relaxation and rest  Aggravating factors: lifting  Progression: worsening    Social Support  Steps to enter house: yes  Stairs in house: yes   Lives in: multiple-level home  Lives with: alone    Employment status: working  Hand dominance: right    Treatments  No previous or current treatments  Patient Goals  Patient goals for therapy: decreased pain, increased strength, independence with ADLs/IADLs, return to sport/leisure activities and return to work          Objective     Observations     Additional Observation Details  ganglion volar radial R wrist    Palpation     Additional Palpation Details  Tender R radial tunnel     Tenderness     Additional Tenderness Details  Tenderness at ganglion    Neurological Testing     Additional Neurological Details  2 point WNL - 5mm thumb , others 4mm    Active Range of Motion     Left Elbow   Normal active range of motion    Right Elbow   Normal active range of motion    Left Wrist   Normal active range of motion    Right Wrist   Normal active range of motion    Additional Active Range of Motion Details  ROM WNL   Digit ROM WNL     Strength/Myotome Testing     Left Shoulder     Planes of Motion   Flexion: 5   Abduction: 5   External rotation at 0°: 4   External rotation at 45°: 5     Right Shoulder     Planes of Motion   Flexion: 5   Abduction: 5   External rotation at 0°: 4   External rotation at 45°: 5     Left Elbow   Normal strength    Right Elbow   Normal strength    Left Wrist/Hand   Normal wrist strength     (2nd hand position)     Trial 1: 84    Thumb Strength  Key/Lateral Pinch     Trial 1: 19  Palmar/Three-Point Pinch     Trial 1: 20    Right Wrist/Hand   Normal wrist strength     (2nd hand position)     Trial 1: 82    Thumb Strength   Key/Lateral Pinch     Trial 1: 19  Palmar/Three-Point Pinch     Trial 1: 13    Additional Strength Details  Pain with R  and pinch     Tests     Left Shoulder   Positive ULTT1, ULTT2 and ULTT3  Right Shoulder   Positive ULTT1, ULTT2 and ULTT3  Right Elbow   Positive radial tunnel  Left Wrist/Hand   Positive Tinel's sign (medial nerve)  Right Wrist/Hand   Positive Tinel's sign (medial nerve) and Tinel's sign (radial tunnel)                Precautions: universal       Manuals 8/25            Graston L Rad/Carpal tunnel 4m             gonzalez MOB med/radial 4m                                      HEP  5 xday             Behavior Mod  Lift, carry            R wrist orthosis  13m                                                                             Ther Ex                                                                                                                     Ther Activity                                       Gait Training                                       Modalities

## 2021-08-27 ENCOUNTER — TELEPHONE (OUTPATIENT)
Dept: PAIN MEDICINE | Facility: CLINIC | Age: 46
End: 2021-08-27

## 2021-08-27 NOTE — TELEPHONE ENCOUNTER
----- Message from Brittany Hutchison DO sent at 8/27/2021  1:42 PM EDT -----  MRI lumbar spine:Multilevel degenerative changes of lumbar spine with varying degrees of foraminal narrowing (mild-to-moderate left L3-L4 and left L4-L5), as detailed above  No significant canal stenosis  Is patient's pain still right-sided, low back versus leg?

## 2021-08-27 NOTE — TELEPHONE ENCOUNTER
Please explain diagnostic medial branch blocks and lumbar radiofrequency, I would recommend a bilateral L3, L4, L5 medial branch block determine if her pain is facet in origin if that was positive she would be a candidate for radiofrequency

## 2021-08-27 NOTE — TELEPHONE ENCOUNTER
RN s/w pt and explained MBB and RFA  Pt would like to proceed with procedures  Ok to schedule? Opal ruize

## 2021-08-27 NOTE — TELEPHONE ENCOUNTER
RN s/w pt and advised of same  Pt states that her pain has always been bilateral in her lower back,but lately her pain has been worse on the left side  She describes sharp pains at her left lower back when she turns her leg out to turn left when she is walking  Pt states that she has a right hip replacement and she always has pain that goes down the lateral side of her right leg to the anterior aspect of her right knee  Advised will notify SL and contact her with recommendations

## 2021-08-28 ENCOUNTER — NURSE TRIAGE (OUTPATIENT)
Dept: OTHER | Facility: OTHER | Age: 46
End: 2021-08-28

## 2021-08-28 DIAGNOSIS — Z20.822 ENCOUNTER FOR SCREENING LABORATORY TESTING FOR COVID-19 VIRUS: Primary | ICD-10-CM

## 2021-08-28 PROCEDURE — 87635 SARS-COV-2 COVID-19 AMP PRB: CPT | Performed by: FAMILY MEDICINE

## 2021-08-28 NOTE — TELEPHONE ENCOUNTER
Patient is requesting a covid test and does not have a [facility]'s PCP  Reports having an out of network PCP  Order placed  Patient informed of closest testing site and was advised of hours of operation, address, to wear a mask, and to stay in the car  Patient verbalized understanding  Patient already has a [employer] account to check for results  Advised to begin checking in 2-3 days after testing is completed  Reason for Disposition   [1] COVID-19 infection suspected by caller or triager AND [2] mild symptoms (cough, fever, or others) AND [1] no complications or SOB    Answer Assessment - Initial Assessment Questions  Were you within 6 feet or less, for up to 15 minutes or more with a person that has a confirmed COVID-19 test? Unknown     What was the date of your exposure? Unknown     Are you experiencing any symptoms attributed to the virus?  (Assess for SOB, cough, fever, difficulty breathing) nausea, diarrhea, fatigue, headache     HIGH RISK: Do you have any history heart or lung conditions, weakened immune system, diabetes, Asthma, CHF, HIV, COPD, Chemo, renal failure, sickle cell, etc? Denies     PREGNANCY: Are you pregnant or did you recently give birth?  Denies    Protocols used: CORONAVIRUS (COVID-19) DIAGNOSED OR SUSPECTED-ADULT-

## 2021-08-30 ENCOUNTER — OFFICE VISIT (OUTPATIENT)
Dept: OCCUPATIONAL THERAPY | Facility: CLINIC | Age: 46
End: 2021-08-30
Payer: COMMERCIAL

## 2021-08-30 DIAGNOSIS — G56.01 CARPAL TUNNEL SYNDROME ON RIGHT: Primary | ICD-10-CM

## 2021-08-30 DIAGNOSIS — G56.31 RADIAL TUNNEL SYNDROME OF RIGHT UPPER EXTREMITY: ICD-10-CM

## 2021-08-30 PROCEDURE — 97140 MANUAL THERAPY 1/> REGIONS: CPT | Performed by: OCCUPATIONAL THERAPIST

## 2021-08-30 PROCEDURE — 97110 THERAPEUTIC EXERCISES: CPT | Performed by: OCCUPATIONAL THERAPIST

## 2021-08-30 NOTE — PROGRESS NOTES
Daily Note     Today's date: 2021  Patient name: Reymundo Vance  : 1975  MRN: [id number]  Referring provider: Elisa Rolle MD  Dx:   Encounter Diagnosis     ICD-10-CM    1  Carpal tunnel syndrome on right  G56 01    2  Radial tunnel syndrome of right upper extremity  G56 31                   Subjective: my arm hurts       Objective: See treatment diary below      Assessment: Tolerated treatment well  Patient has tender radial tunnel      Plan: Continue per plan of care        Precautions: universal       Manuals            Graston L Rad/Carpal tunnel 4m 4m            gonzalez MOB med/radial 4m 4m           TPR rad luciana  4m           K tape rad luciana   apply           HEP  5 xday             Behavior Mod  Lift, carry            R wrist orthosis  13m                                                                             Ther Ex             PROM wrist  4m           PROM elbow  4m           ER/IR/PRO  2#cuff  3x10                                                                            Ther Activity                                       Gait Training                                       Modalities             Mh pre  10m           Cp post   5m

## 2021-08-31 ENCOUNTER — HOSPITAL ENCOUNTER (OUTPATIENT)
Dept: RADIOLOGY | Facility: HOSPITAL | Age: 46
Discharge: HOME | End: 2021-08-31
Payer: COMMERCIAL

## 2021-08-31 DIAGNOSIS — M47.816 LUMBAR SPONDYLOSIS: Primary | ICD-10-CM

## 2021-08-31 DIAGNOSIS — G56.01 CARPAL TUNNEL SYNDROME ON RIGHT: ICD-10-CM

## 2021-08-31 PROCEDURE — 76882 US LMTD JT/FCL EVL NVASC XTR: CPT

## 2021-08-31 NOTE — TELEPHONE ENCOUNTER
PER OP INSTRUCTIONS:    Reviewed instructions: Instructed pt to arrive at appt time, no earlier  Pt to wear a mask  Pt will need a  but they must wait in the car, NPO 1 hour prior, loose-fitting/comfortable clothes, no buttons/zippers and no ear rings/neckless, if ill/fever/infx/abx to call and reschedule  No immunizations or vaccinations 2w prior/after steroid injections  Hold medication  x _ full days prior, last dose on _  Patient advised to hold medication from Date till their appointment at that time instructions to restart will be given  Patient stated verbal understanding  Aware that nursing will call her to review hold dates as well  COVID DISCLAIMER:    COVID disclaimer/ screening completed  Pt states they have NOT had COVID/no COVID vaccine and are aware no vaccines two weeks before and two weeks after procedure  Because of possible immunosuppression due to steroid, pt is aware that they should practice more strict social distancing, masking, handwashing for 2-3 weeks following procedure  Reviewed check in process with pt- will enter building no earlier than arrival time given, agreed to wear mask for duration of appt,  will wait in car

## 2021-09-01 ENCOUNTER — APPOINTMENT (OUTPATIENT)
Dept: LAB | Facility: HOSPITAL | Age: 46
End: 2021-09-01
Payer: COMMERCIAL

## 2021-09-01 DIAGNOSIS — R19.7 DIARRHEA OF PRESUMED INFECTIOUS ORIGIN: ICD-10-CM

## 2021-09-01 PROCEDURE — 87505 NFCT AGENT DETECTION GI: CPT

## 2021-09-01 PROCEDURE — 87205 SMEAR GRAM STAIN: CPT

## 2021-09-01 PROCEDURE — 36415 COLL VENOUS BLD VENIPUNCTURE: CPT

## 2021-09-02 LAB
CAMPYLOBACTER DNA SPEC NAA+PROBE: NORMAL
SALMONELLA DNA SPEC QL NAA+PROBE: NORMAL
SHIGA TOXIN STX GENE SPEC NAA+PROBE: NORMAL
SHIGELLA DNA SPEC QL NAA+PROBE: NORMAL

## 2021-09-17 ENCOUNTER — HOSPITAL ENCOUNTER (OUTPATIENT)
Dept: RADIOLOGY | Facility: CLINIC | Age: 46
Discharge: HOME | End: 2021-09-17
Attending: ANESTHESIOLOGY
Payer: COMMERCIAL

## 2021-09-17 VITALS
OXYGEN SATURATION: 98 % | DIASTOLIC BLOOD PRESSURE: 87 MMHG | TEMPERATURE: 97.5 F | RESPIRATION RATE: 20 BRPM | HEART RATE: 100 BPM | SYSTOLIC BLOOD PRESSURE: 134 MMHG

## 2021-09-17 DIAGNOSIS — M47.816 LUMBAR SPONDYLOSIS: ICD-10-CM

## 2021-09-17 PROCEDURE — 64494 INJ PARAVERT F JNT L/S 2 LEV: CPT | Performed by: ANESTHESIOLOGY

## 2021-09-17 PROCEDURE — 64493 INJ PARAVERT F JNT L/S 1 LEV: CPT | Performed by: ANESTHESIOLOGY

## 2021-09-17 RX ORDER — BUPIVACAINE HCL/PF 2.5 MG/ML
10 VIAL (ML) INJECTION ONCE
Status: COMPLETED | OUTPATIENT
Start: 2021-09-17 | End: 2021-09-17

## 2021-09-17 RX ADMIN — BUPIVACAINE HYDROCHLORIDE 3 ML: 2.5 INJECTION, SOLUTION EPIDURAL; INFILTRATION; INTRACAUDAL at 14:59

## 2021-09-17 NOTE — DISCHARGE INSTRUCTIONS
ACTIVITY  · Please do activities that will bring on the normal pain that we are rating  For example, if vacuuming or walking increases the pain, do that  This will give the most accurate response to the diary  · You may shower, but no tub baths today, or applied heat  CARE OF THE INJECTION SITE  · This area may be numb for several hours after the injection  · [facility] if you have any of the following:  redness, drainage, swelling, or fever above 100°F     SPECIAL INSTRUCTIONS  · Please return the MBB diary to our office by mail, fax, or drop it off  MEDICATIONS  · Please do not take any break through or short acting pain medications for 8 hours after the block  · Continue to take all routine medications  · Our office may have instructed you to hold some medications  As no general anesthesia was used in today's procedure, you should not experience any side effects related to anesthesia  If you have a problem specifically related to your procedure, please call our office at [phone]  Problems not related to your procedure should be directed to your primary care physician

## 2021-09-17 NOTE — H&P
History of Present Illness: The patient is a 55 y o  female who presents with complaints of low back pain  Patient Active Problem List   Diagnosis    Anxiety disorder    Arthropathy    Backache    Chronic low back pain    Hyperlipidemia    Irritable bowel syndrome    Major depression, single episode, in complete remission ([address] )    Morbid obesity [facility] [address]    Carpal tunnel syndrome on right    Ganglion cyst of volar aspect of right wrist    Radial tunnel syndrome of right upper extremity    Carpal tunnel syndrome on left    Lumbar spondylosis       Past Medical History:   Diagnosis Date    Arthritis     Carpal tunnel syndrome     Heel spur     Hyperlipidemia     Psychiatric disorder     Radial tunnel syndrome        Past Surgical History:   Procedure Laterality Date    CORE DECOMPRESSION HIP Right     JOINT REPLACEMENT Right 2016    [facility]    TOTAL HIP ARTHROPLASTY           Current Outpatient Medications:     aspirin (ECOTRIN LOW STRENGTH) 81 mg EC tablet, Take 81 mg by mouth daily, Disp: , Rfl:     diflunisal (DOLOBID) 500 mg tablet, Take 500 mg by mouth 2 (two) times a day, Disp: , Rfl:     DULoxetine (CYMBALTA) 60 mg delayed release capsule, Take 60 mg by mouth daily, Disp: , Rfl:     traMADol (ULTRAM) 50 mg tablet, Take 50 mg by mouth every 6 (six) hours as needed for moderate pain, Disp: , Rfl:     traZODone (DESYREL) 100 mg tablet, Take 50 mg by mouth daily at bedtime, Disp: , Rfl:     Current Facility-Administered Medications:     bupivacaine (PF) (MARCAINE) 0 25 % injection 10 mL, 10 mL, Perineural, Once, John Collazo, DO    No Known Allergies    Physical Exam:   General: Awake, Alert, Oriented x 3, Mood and affect appropriate  Respiratory: Respirations even and unlabored  Cardiovascular: Peripheral pulses intact; no edema  Musculoskeletal Exam:  Pain with lumbar extension    ASA Score: II         Assessment:   1   Lumbar spondylosis        Plan: bilateral L3, L4, L5 MBB with 0 25% bupivacaine pain diary

## 2021-09-27 ENCOUNTER — HOSPITAL ENCOUNTER (OUTPATIENT)
Dept: RADIOLOGY | Facility: CLINIC | Age: 46
Discharge: HOME | End: 2021-09-27
Attending: ANESTHESIOLOGY | Admitting: ANESTHESIOLOGY
Payer: COMMERCIAL

## 2021-09-27 VITALS
TEMPERATURE: 97.5 F | HEART RATE: 88 BPM | SYSTOLIC BLOOD PRESSURE: 130 MMHG | RESPIRATION RATE: 20 BRPM | OXYGEN SATURATION: 97 % | DIASTOLIC BLOOD PRESSURE: 89 MMHG

## 2021-09-27 DIAGNOSIS — M47.816 LUMBAR SPONDYLOSIS: ICD-10-CM

## 2021-09-27 LAB — WBC STL QL MICRO: NORMAL

## 2021-09-27 PROCEDURE — 64494 INJ PARAVERT F JNT L/S 2 LEV: CPT | Performed by: ANESTHESIOLOGY

## 2021-09-27 PROCEDURE — 64493 INJ PARAVERT F JNT L/S 1 LEV: CPT | Performed by: ANESTHESIOLOGY

## 2021-09-27 RX ORDER — BUPIVACAINE HYDROCHLORIDE 7.5 MG/ML
10 INJECTION, SOLUTION EPIDURAL; RETROBULBAR ONCE
Status: COMPLETED | OUTPATIENT
Start: 2021-09-27 | End: 2021-09-27

## 2021-09-27 RX ADMIN — BUPIVACAINE HYDROCHLORIDE 6 ML: 7.5 INJECTION, SOLUTION EPIDURAL; RETROBULBAR at 09:57

## 2021-09-27 NOTE — H&P
History of Present Illness: The patient is a 55 y o  female who presents with complaints of low back pain  Patient Active Problem List   Diagnosis    Anxiety disorder    Arthropathy    Backache    Chronic low back pain    Hyperlipidemia    Irritable bowel syndrome    Major depression, single episode, in complete remission ([address] )    Morbid obesity ([facility] [address] [facility]    Carpal tunnel syndrome on right    Ganglion cyst of volar aspect of right wrist    Radial tunnel syndrome of right upper extremity    Carpal tunnel syndrome on left    Lumbar spondylosis       Past Medical History:   Diagnosis Date    Arthritis     Carpal tunnel syndrome     Heel spur     Hyperlipidemia     Psychiatric disorder     Radial tunnel syndrome        Past Surgical History:   Procedure Laterality Date    CORE DECOMPRESSION HIP Right     JOINT REPLACEMENT Right 2016    [facility]    TOTAL HIP ARTHROPLASTY           Current Outpatient Medications:     aspirin (ECOTRIN LOW STRENGTH) 81 mg EC tablet, Take 81 mg by mouth daily, Disp: , Rfl:     diflunisal (DOLOBID) 500 mg tablet, Take 500 mg by mouth 2 (two) times a day, Disp: , Rfl:     DULoxetine (CYMBALTA) 60 mg delayed release capsule, Take 60 mg by mouth daily, Disp: , Rfl:     traMADol (ULTRAM) 50 mg tablet, Take 50 mg by mouth every 6 (six) hours as needed for moderate pain, Disp: , Rfl:     traZODone (DESYREL) 100 mg tablet, Take 50 mg by mouth daily at bedtime, Disp: , Rfl:     Current Facility-Administered Medications:     bupivacaine (PF) (MARCAINE) 0 75 % injection 10 mL, 10 mL, Other, Once, John Collazo, DO    No Known Allergies    Physical Exam:   General: Awake, Alert, Oriented x 3, Mood and affect appropriate  Respiratory: Respirations even and unlabored  Cardiovascular: Peripheral pulses intact; no edema  Musculoskeletal Exam:  Pain with lumbar extension    ASA Score: III         Assessment:   1   Lumbar spondylosis        Plan: Bilateral L3,4,5 MBB 0 75%bup Pain Dairy

## 2021-09-27 NOTE — DISCHARGE INSTRUCTIONS
ACTIVITY  · Please do activities that will bring on the normal pain that we are rating  For example, if vacuuming or walking increases the pain, do that  This will give the most accurate response to the diary  · You may shower, but no tub baths today, or applied heat  CARE OF THE INJECTION SITE  · This area may be numb for several hours after the injection  · [facility]ify [facility] if you have any of the following:  redness, drainage, swelling, or fever above 100°F     SPECIAL INSTRUCTIONS  · Please return the MBB diary to our office by mail, fax, or drop it off  MEDICATIONS  · Please do not take any break through or short acting pain medications for 8 hours after the block  · Continue to take all routine medications  · Our office may have instructed you to hold some medications  As no general anesthesia was used in today's procedure, you should not experience any side effects related to anesthesia  If you have a problem specifically related to your procedure, please call our office at [phone]  Problems not related to your procedure should be directed to your primary care physician

## 2021-09-29 ENCOUNTER — TELEPHONE (OUTPATIENT)
Dept: PAIN MEDICINE | Facility: CLINIC | Age: 46
End: 2021-09-29

## 2021-09-29 DIAGNOSIS — M47.816 LUMBAR SPONDYLOSIS: Primary | ICD-10-CM

## 2021-09-29 NOTE — TELEPHONE ENCOUNTER
Pt called in to check the status of the pain diary  Pt has been advised that it was received and the doctor will review  Please be advised thank you    Pt can be reached @ [phone]

## 2021-10-04 ENCOUNTER — HOSPITAL ENCOUNTER (OUTPATIENT)
Dept: RADIOLOGY | Facility: CLINIC | Age: 46
Discharge: HOME | End: 2021-10-04
Attending: ANESTHESIOLOGY
Payer: COMMERCIAL

## 2021-10-04 ENCOUNTER — TELEPHONE (OUTPATIENT)
Dept: PAIN MEDICINE | Facility: CLINIC | Age: 46
End: 2021-10-04

## 2021-10-04 VITALS
RESPIRATION RATE: 20 BRPM | SYSTOLIC BLOOD PRESSURE: 116 MMHG | OXYGEN SATURATION: 96 % | DIASTOLIC BLOOD PRESSURE: 80 MMHG | TEMPERATURE: 97.5 F | HEART RATE: 85 BPM

## 2021-10-04 DIAGNOSIS — M47.816 LUMBAR SPONDYLOSIS: ICD-10-CM

## 2021-10-04 PROCEDURE — 64635 DESTROY LUMB/SAC FACET JNT: CPT | Performed by: ANESTHESIOLOGY

## 2021-10-04 PROCEDURE — 64636 DESTROY L/S FACET JNT ADDL: CPT | Performed by: ANESTHESIOLOGY

## 2021-10-04 RX ADMIN — LIDOCAINE HYDROCHLORIDE 3 ML: 20 INJECTION, SOLUTION EPIDURAL; INFILTRATION; INTRACAUDAL at 11:43

## 2021-10-08 ENCOUNTER — OFFICE VISIT (OUTPATIENT)
Dept: OBGYN CLINIC | Facility: HOSPITAL | Age: 46
End: 2021-10-08
Payer: COMMERCIAL

## 2021-10-08 VITALS
BODY MASS INDEX: 37.69 KG/M2 | HEART RATE: 80 BPM | DIASTOLIC BLOOD PRESSURE: 83 MMHG | HEIGHT: 65 IN | SYSTOLIC BLOOD PRESSURE: 118 MMHG | WEIGHT: 226.2 LBS

## 2021-10-08 DIAGNOSIS — M67.431 GANGLION CYST OF VOLAR ASPECT OF RIGHT WRIST: ICD-10-CM

## 2021-10-08 DIAGNOSIS — G56.01 CARPAL TUNNEL SYNDROME ON RIGHT: Primary | ICD-10-CM

## 2021-10-08 PROCEDURE — 99214 OFFICE O/P EST MOD 30 MIN: CPT | Performed by: ORTHOPAEDIC SURGERY

## 2021-10-08 RX ORDER — CEFAZOLIN SODIUM 2 G/50ML
2000 SOLUTION INTRAVENOUS ONCE
Status: CANCELLED | OUTPATIENT
Start: 2021-10-08 | End: 2021-10-08

## 2021-10-18 ENCOUNTER — TELEPHONE (OUTPATIENT)
Dept: PAIN MEDICINE | Facility: CLINIC | Age: 46
End: 2021-10-18

## 2021-10-18 ENCOUNTER — HOSPITAL ENCOUNTER (OUTPATIENT)
Dept: RADIOLOGY | Facility: CLINIC | Age: 46
Discharge: HOME | End: 2021-10-18
Attending: ANESTHESIOLOGY
Payer: COMMERCIAL

## 2021-10-18 VITALS
OXYGEN SATURATION: 96 % | RESPIRATION RATE: 20 BRPM | TEMPERATURE: 97.7 F | DIASTOLIC BLOOD PRESSURE: 77 MMHG | SYSTOLIC BLOOD PRESSURE: 124 MMHG | HEART RATE: 90 BPM

## 2021-10-18 DIAGNOSIS — M47.816 LUMBAR SPONDYLOSIS: ICD-10-CM

## 2021-10-18 PROCEDURE — 64636 DESTROY L/S FACET JNT ADDL: CPT | Performed by: ANESTHESIOLOGY

## 2021-10-18 PROCEDURE — 64635 DESTROY LUMB/SAC FACET JNT: CPT | Performed by: ANESTHESIOLOGY

## 2021-10-18 RX ADMIN — LIDOCAINE HYDROCHLORIDE 3 ML: 20 INJECTION, SOLUTION EPIDURAL; INFILTRATION; INTRACAUDAL at 11:40

## 2021-11-15 ENCOUNTER — OFFICE VISIT (OUTPATIENT)
Dept: PAIN MEDICINE | Facility: CLINIC | Age: 46
End: 2021-11-15
Payer: COMMERCIAL

## 2021-11-15 VITALS
HEART RATE: 102 BPM | DIASTOLIC BLOOD PRESSURE: 74 MMHG | HEIGHT: 65 IN | WEIGHT: 234 LBS | TEMPERATURE: 98.1 F | BODY MASS INDEX: 38.99 KG/M2 | SYSTOLIC BLOOD PRESSURE: 118 MMHG

## 2021-11-15 DIAGNOSIS — M79.18 MYOFASCIAL PAIN SYNDROME: ICD-10-CM

## 2021-11-15 DIAGNOSIS — M54.50 CHRONIC BILATERAL LOW BACK PAIN WITHOUT SCIATICA: ICD-10-CM

## 2021-11-15 DIAGNOSIS — G89.4 CHRONIC PAIN SYNDROME: Primary | ICD-10-CM

## 2021-11-15 DIAGNOSIS — G89.29 CHRONIC BILATERAL LOW BACK PAIN WITHOUT SCIATICA: ICD-10-CM

## 2021-11-15 DIAGNOSIS — M47.816 LUMBAR SPONDYLOSIS: ICD-10-CM

## 2021-11-15 PROCEDURE — 99213 OFFICE O/P EST LOW 20 MIN: CPT | Performed by: NURSE PRACTITIONER

## 2021-11-15 RX ORDER — CYCLOBENZAPRINE HCL 5 MG
10 TABLET ORAL 3 TIMES DAILY
COMMUNITY
Start: 2021-11-04

## 2021-11-28 DIAGNOSIS — H66.002 NON-RECURRENT ACUTE SUPPURATIVE OTITIS MEDIA OF LEFT EAR WITHOUT SPONTANEOUS RUPTURE OF TYMPANIC MEMBRANE: Primary | ICD-10-CM

## 2021-11-28 RX ORDER — OFLOXACIN 3 MG/ML
2 SOLUTION/ DROPS OPHTHALMIC 4 TIMES DAILY
Qty: 5 ML | Refills: 0 | Status: SHIPPED | OUTPATIENT
Start: 2021-11-28 | End: 2022-01-20

## 2021-12-06 ENCOUNTER — ANESTHESIA EVENT (OUTPATIENT)
Dept: PERIOP | Facility: HOSPITAL | Age: 46
End: 2021-12-06
Payer: COMMERCIAL

## 2021-12-07 ENCOUNTER — HOSPITAL ENCOUNTER (OUTPATIENT)
Facility: HOSPITAL | Age: 46
Setting detail: OUTPATIENT SURGERY
Discharge: HOME | End: 2021-12-07
Attending: ORTHOPAEDIC SURGERY | Admitting: ORTHOPAEDIC SURGERY
Payer: COMMERCIAL

## 2021-12-07 ENCOUNTER — ANESTHESIA (OUTPATIENT)
Dept: PERIOP | Facility: HOSPITAL | Age: 46
End: 2021-12-07
Payer: COMMERCIAL

## 2021-12-07 VITALS
TEMPERATURE: 98.3 F | OXYGEN SATURATION: 94 % | RESPIRATION RATE: 16 BRPM | SYSTOLIC BLOOD PRESSURE: 121 MMHG | HEIGHT: 65 IN | WEIGHT: 238 LBS | DIASTOLIC BLOOD PRESSURE: 77 MMHG | BODY MASS INDEX: 39.65 KG/M2 | HEART RATE: 98 BPM

## 2021-12-07 DIAGNOSIS — G56.01 CARPAL TUNNEL SYNDROME ON RIGHT: ICD-10-CM

## 2021-12-07 DIAGNOSIS — M67.431 GANGLION CYST OF VOLAR ASPECT OF RIGHT WRIST: Primary | ICD-10-CM

## 2021-12-07 LAB
EXT PREGNANCY TEST URINE: NEGATIVE
EXT. CONTROL: NORMAL

## 2021-12-07 PROCEDURE — 88304 TISSUE EXAM BY PATHOLOGIST: CPT | Performed by: PATHOLOGY

## 2021-12-07 PROCEDURE — 81025 URINE PREGNANCY TEST: CPT | Performed by: ORTHOPAEDIC SURGERY

## 2021-12-07 PROCEDURE — 25111 REMOVE WRIST TENDON LESION: CPT | Performed by: ORTHOPAEDIC SURGERY

## 2021-12-07 PROCEDURE — NC001 PR NO CHARGE: Performed by: ORTHOPAEDIC SURGERY

## 2021-12-07 PROCEDURE — 29848 WRIST ENDOSCOPY/SURGERY: CPT | Performed by: ORTHOPAEDIC SURGERY

## 2021-12-07 RX ORDER — HYDROCODONE BITARTRATE AND ACETAMINOPHEN 5; 325 MG/1; MG/1
1 TABLET ORAL EVERY 6 HOURS PRN
Qty: 5 TABLET | Refills: 0 | Status: SHIPPED | OUTPATIENT
Start: 2021-12-07 | End: 2021-12-12

## 2021-12-07 RX ORDER — FENTANYL CITRATE/PF 50 MCG/ML
50 SYRINGE (ML) INJECTION
Status: DISCONTINUED | OUTPATIENT
Start: 2021-12-07 | End: 2021-12-07 | Stop reason: HOSPADM

## 2021-12-07 RX ORDER — FENTANYL CITRATE 50 UG/ML
INJECTION, SOLUTION INTRAMUSCULAR; INTRAVENOUS AS NEEDED
Status: DISCONTINUED | OUTPATIENT
Start: 2021-12-07 | End: 2021-12-07

## 2021-12-07 RX ORDER — CEFAZOLIN SODIUM 2 G/50ML
2000 SOLUTION INTRAVENOUS ONCE
Status: COMPLETED | OUTPATIENT
Start: 2021-12-07 | End: 2021-12-07

## 2021-12-07 RX ORDER — SODIUM CHLORIDE, SODIUM LACTATE, POTASSIUM CHLORIDE, CALCIUM CHLORIDE 600; 310; 30; 20 MG/100ML; MG/100ML; MG/100ML; MG/100ML
125 INJECTION, SOLUTION INTRAVENOUS CONTINUOUS
Status: DISCONTINUED | OUTPATIENT
Start: 2021-12-07 | End: 2021-12-07 | Stop reason: HOSPADM

## 2021-12-07 RX ORDER — SENNOSIDES 8.6 MG
650 CAPSULE ORAL EVERY 8 HOURS PRN
Qty: 30 TABLET | Refills: 0 | Status: SHIPPED | OUTPATIENT
Start: 2021-12-07

## 2021-12-07 RX ORDER — LIDOCAINE HYDROCHLORIDE 10 MG/ML
INJECTION, SOLUTION EPIDURAL; INFILTRATION; INTRACAUDAL; PERINEURAL AS NEEDED
Status: DISCONTINUED | OUTPATIENT
Start: 2021-12-07 | End: 2021-12-07

## 2021-12-07 RX ORDER — NAPROXEN SODIUM 220 MG
220 TABLET ORAL 2 TIMES DAILY WITH MEALS
Qty: 20 TABLET | Refills: 0 | Status: SHIPPED | OUTPATIENT
Start: 2021-12-07

## 2021-12-07 RX ORDER — SODIUM CHLORIDE, SODIUM LACTATE, POTASSIUM CHLORIDE, CALCIUM CHLORIDE 600; 310; 30; 20 MG/100ML; MG/100ML; MG/100ML; MG/100ML
20 INJECTION, SOLUTION INTRAVENOUS CONTINUOUS
Status: DISCONTINUED | OUTPATIENT
Start: 2021-12-07 | End: 2021-12-07 | Stop reason: HOSPADM

## 2021-12-07 RX ORDER — MIDAZOLAM HYDROCHLORIDE 2 MG/2ML
INJECTION, SOLUTION INTRAMUSCULAR; INTRAVENOUS AS NEEDED
Status: DISCONTINUED | OUTPATIENT
Start: 2021-12-07 | End: 2021-12-07

## 2021-12-07 RX ORDER — DEXAMETHASONE SODIUM PHOSPHATE 10 MG/ML
INJECTION, SOLUTION INTRAMUSCULAR; INTRAVENOUS AS NEEDED
Status: DISCONTINUED | OUTPATIENT
Start: 2021-12-07 | End: 2021-12-07

## 2021-12-07 RX ORDER — PROPOFOL 10 MG/ML
INJECTION, EMULSION INTRAVENOUS AS NEEDED
Status: DISCONTINUED | OUTPATIENT
Start: 2021-12-07 | End: 2021-12-07

## 2021-12-07 RX ORDER — ONDANSETRON 2 MG/ML
4 INJECTION INTRAMUSCULAR; INTRAVENOUS ONCE AS NEEDED
Status: DISCONTINUED | OUTPATIENT
Start: 2021-12-07 | End: 2021-12-07 | Stop reason: HOSPADM

## 2021-12-07 RX ORDER — ONDANSETRON 2 MG/ML
INJECTION INTRAMUSCULAR; INTRAVENOUS AS NEEDED
Status: DISCONTINUED | OUTPATIENT
Start: 2021-12-07 | End: 2021-12-07

## 2021-12-07 RX ORDER — SODIUM CHLORIDE, SODIUM LACTATE, POTASSIUM CHLORIDE, CALCIUM CHLORIDE 600; 310; 30; 20 MG/100ML; MG/100ML; MG/100ML; MG/100ML
INJECTION, SOLUTION INTRAVENOUS CONTINUOUS PRN
Status: DISCONTINUED | OUTPATIENT
Start: 2021-12-07 | End: 2021-12-07

## 2021-12-07 RX ADMIN — CEFAZOLIN SODIUM 2000 MG: 2 SOLUTION INTRAVENOUS at 08:03

## 2021-12-07 RX ADMIN — Medication 50 MCG: at 08:52

## 2021-12-07 RX ADMIN — LIDOCAINE HYDROCHLORIDE 50 MG: 10 INJECTION, SOLUTION EPIDURAL; INFILTRATION; INTRACAUDAL; PERINEURAL at 07:54

## 2021-12-07 RX ADMIN — DEXAMETHASONE SODIUM PHOSPHATE 10 MG: 10 INJECTION, SOLUTION INTRAMUSCULAR; INTRAVENOUS at 08:08

## 2021-12-07 RX ADMIN — FENTANYL CITRATE 25 MCG: 50 INJECTION INTRAMUSCULAR; INTRAVENOUS at 08:08

## 2021-12-07 RX ADMIN — ONDANSETRON 4 MG: 2 INJECTION INTRAMUSCULAR; INTRAVENOUS at 07:49

## 2021-12-07 RX ADMIN — FENTANYL CITRATE 25 MCG: 50 INJECTION INTRAMUSCULAR; INTRAVENOUS at 08:16

## 2021-12-07 RX ADMIN — PROPOFOL 200 MG: 10 INJECTION, EMULSION INTRAVENOUS at 07:54

## 2021-12-07 RX ADMIN — MIDAZOLAM 2 MG: 1 INJECTION INTRAMUSCULAR; INTRAVENOUS at 07:49

## 2021-12-07 RX ADMIN — SODIUM CHLORIDE, SODIUM LACTATE, POTASSIUM CHLORIDE, AND CALCIUM CHLORIDE: .6; .31; .03; .02 INJECTION, SOLUTION INTRAVENOUS at 07:50

## 2021-12-07 RX ADMIN — FENTANYL CITRATE 25 MCG: 50 INJECTION INTRAMUSCULAR; INTRAVENOUS at 07:53

## 2021-12-07 RX ADMIN — FENTANYL CITRATE 25 MCG: 50 INJECTION INTRAMUSCULAR; INTRAVENOUS at 08:03

## 2021-12-17 ENCOUNTER — OFFICE VISIT (OUTPATIENT)
Dept: OBGYN CLINIC | Facility: HOSPITAL | Age: 46
End: 2021-12-17

## 2021-12-17 VITALS
HEART RATE: 101 BPM | SYSTOLIC BLOOD PRESSURE: 116 MMHG | WEIGHT: 238 LBS | HEIGHT: 65 IN | BODY MASS INDEX: 39.65 KG/M2 | DIASTOLIC BLOOD PRESSURE: 88 MMHG

## 2021-12-17 DIAGNOSIS — Z98.890 S/P MUSCULOSKELETAL SYSTEM SURGERY: Primary | ICD-10-CM

## 2021-12-17 PROCEDURE — 99024 POSTOP FOLLOW-UP VISIT: CPT | Performed by: ORTHOPAEDIC SURGERY

## 2021-12-17 RX ORDER — CEFADROXIL 500 MG/1
500 CAPSULE ORAL EVERY 12 HOURS SCHEDULED
Qty: 14 CAPSULE | Refills: 0 | Status: SHIPPED | OUTPATIENT
Start: 2021-12-17 | End: 2021-12-24

## 2021-12-29 ENCOUNTER — TELEPHONE (OUTPATIENT)
Dept: GASTROENTEROLOGY | Facility: CLINIC | Age: 46
End: 2021-12-29

## 2021-12-29 ENCOUNTER — OFFICE VISIT (OUTPATIENT)
Dept: GASTROENTEROLOGY | Facility: CLINIC | Age: 46
End: 2021-12-29
Payer: COMMERCIAL

## 2021-12-29 ENCOUNTER — APPOINTMENT (OUTPATIENT)
Dept: LAB | Facility: HOSPITAL | Age: 46
End: 2021-12-29
Payer: COMMERCIAL

## 2021-12-29 ENCOUNTER — LAB (OUTPATIENT)
Dept: LAB | Facility: HOSPITAL | Age: 46
End: 2021-12-29
Payer: COMMERCIAL

## 2021-12-29 VITALS
SYSTOLIC BLOOD PRESSURE: 112 MMHG | WEIGHT: 236.8 LBS | DIASTOLIC BLOOD PRESSURE: 84 MMHG | HEIGHT: 65 IN | BODY MASS INDEX: 39.45 KG/M2

## 2021-12-29 DIAGNOSIS — Z83.71 FAMILY HISTORY OF COLONIC POLYPS: ICD-10-CM

## 2021-12-29 DIAGNOSIS — R19.7 DIARRHEA, UNSPECIFIED TYPE: ICD-10-CM

## 2021-12-29 DIAGNOSIS — R19.7 DIARRHEA, UNSPECIFIED TYPE: Primary | ICD-10-CM

## 2021-12-29 PROCEDURE — 87493 C DIFF AMPLIFIED PROBE: CPT

## 2021-12-29 PROCEDURE — 36415 COLL VENOUS BLD VENIPUNCTURE: CPT | Performed by: NURSE PRACTITIONER

## 2021-12-29 PROCEDURE — 82656 EL-1 FECAL QUAL/SEMIQ: CPT

## 2021-12-29 PROCEDURE — 83516 IMMUNOASSAY NONANTIBODY: CPT | Performed by: NURSE PRACTITIONER

## 2021-12-29 PROCEDURE — 82705 FATS/LIPIDS FECES QUAL: CPT

## 2021-12-29 PROCEDURE — 83993 ASSAY FOR CALPROTECTIN FECAL: CPT

## 2021-12-29 PROCEDURE — 82784 ASSAY IGA/IGD/IGG/IGM EACH: CPT | Performed by: NURSE PRACTITIONER

## 2021-12-29 PROCEDURE — 86255 FLUORESCENT ANTIBODY SCREEN: CPT | Performed by: NURSE PRACTITIONER

## 2021-12-29 PROCEDURE — 87329 GIARDIA AG IA: CPT | Performed by: NURSE PRACTITIONER

## 2021-12-29 PROCEDURE — 99203 OFFICE O/P NEW LOW 30 MIN: CPT | Performed by: NURSE PRACTITIONER

## 2021-12-29 RX ORDER — DICYCLOMINE HYDROCHLORIDE 10 MG/1
10 CAPSULE ORAL
Qty: 120 CAPSULE | Refills: 3 | Status: SHIPPED | OUTPATIENT
Start: 2021-12-29

## 2021-12-29 RX ORDER — MULTIVITAMIN WITH IRON
100 TABLET ORAL DAILY
COMMUNITY

## 2021-12-29 RX ORDER — ACETAMINOPHEN 160 MG
2000 TABLET,DISINTEGRATING ORAL DAILY
COMMUNITY

## 2021-12-29 RX ORDER — ASCORBIC ACID 500 MG
500 TABLET ORAL DAILY
COMMUNITY

## 2021-12-29 RX ORDER — SODIUM PICOSULFATE, MAGNESIUM OXIDE, AND ANHYDROUS CITRIC ACID 10; 3.5; 12 MG/160ML; G/160ML; G/160ML
LIQUID ORAL
Qty: 320 ML | Refills: 0 | Status: SHIPPED | COMMUNITY
Start: 2021-12-29 | End: 2022-01-20 | Stop reason: HOSPADM

## 2021-12-29 RX ORDER — CHOLESTYRAMINE 4 G/9G
1 POWDER, FOR SUSPENSION ORAL
Qty: 30 PACKET | Refills: 2 | Status: SHIPPED | OUTPATIENT
Start: 2021-12-29

## 2021-12-30 ENCOUNTER — TELEPHONE (OUTPATIENT)
Dept: GASTROENTEROLOGY | Facility: CLINIC | Age: 46
End: 2021-12-30

## 2021-12-30 LAB
C DIFF TOX GENS STL QL NAA+PROBE: NEGATIVE
ENDOMYSIUM IGA SER QL: NEGATIVE
G LAMBLIA AG STL QL IA: NEGATIVE
GLIADIN PEPTIDE IGA SER-ACNC: 6 UNITS (ref 0–19)
GLIADIN PEPTIDE IGG SER-ACNC: 2 UNITS (ref 0–19)
IGA SERPL-MCNC: 277 MG/DL (ref 87–352)
TTG IGA SER-ACNC: <2 U/ML (ref 0–3)
TTG IGG SER-ACNC: <2 U/ML (ref 0–5)

## 2022-01-04 LAB
CALPROTECTIN STL-MCNT: 226 UG/G (ref 0–120)
FAT STL QL: NORMAL
NEUTRAL FAT STL QL: NORMAL

## 2022-01-05 ENCOUNTER — APPOINTMENT (OUTPATIENT)
Dept: LAB | Facility: HOSPITAL | Age: 47
End: 2022-01-05
Payer: COMMERCIAL

## 2022-01-05 DIAGNOSIS — R19.7 DIARRHEA, UNSPECIFIED TYPE: ICD-10-CM

## 2022-01-05 LAB
CRP SERPL QL: <3 MG/L
ELASTASE PANC STL-MCNT: 97 UG ELAST./G
TSH SERPL DL<=0.05 MIU/L-ACNC: 2.45 UIU/ML (ref 0.36–3.74)

## 2022-01-05 PROCEDURE — 86140 C-REACTIVE PROTEIN: CPT

## 2022-01-05 PROCEDURE — 84443 ASSAY THYROID STIM HORMONE: CPT

## 2022-01-05 PROCEDURE — 36415 COLL VENOUS BLD VENIPUNCTURE: CPT

## 2022-01-20 ENCOUNTER — ANESTHESIA (OUTPATIENT)
Dept: GASTROENTEROLOGY | Facility: AMBULATORY SURGERY CENTER | Age: 47
End: 2022-01-20

## 2022-01-20 ENCOUNTER — HOSPITAL ENCOUNTER (OUTPATIENT)
Dept: GASTROENTEROLOGY | Facility: AMBULATORY SURGERY CENTER | Age: 47
Discharge: HOME | End: 2022-01-20
Payer: COMMERCIAL

## 2022-01-20 ENCOUNTER — ANESTHESIA EVENT (OUTPATIENT)
Dept: GASTROENTEROLOGY | Facility: AMBULATORY SURGERY CENTER | Age: 47
End: 2022-01-20

## 2022-01-20 VITALS
SYSTOLIC BLOOD PRESSURE: 111 MMHG | DIASTOLIC BLOOD PRESSURE: 71 MMHG | TEMPERATURE: 97.5 F | RESPIRATION RATE: 12 BRPM | HEART RATE: 89 BPM | OXYGEN SATURATION: 99 %

## 2022-01-20 DIAGNOSIS — Z83.71 FAMILY HISTORY OF COLONIC POLYPS: ICD-10-CM

## 2022-01-20 DIAGNOSIS — R19.7 DIARRHEA, UNSPECIFIED TYPE: ICD-10-CM

## 2022-01-20 LAB
EXT PREGNANCY TEST URINE: NEGATIVE
EXT. CONTROL: NORMAL

## 2022-01-20 PROCEDURE — 88305 TISSUE EXAM BY PATHOLOGIST: CPT | Performed by: SPECIALIST

## 2022-01-20 PROCEDURE — 45380 COLONOSCOPY AND BIOPSY: CPT | Performed by: INTERNAL MEDICINE

## 2022-01-20 RX ORDER — SODIUM CHLORIDE, SODIUM LACTATE, POTASSIUM CHLORIDE, CALCIUM CHLORIDE 600; 310; 30; 20 MG/100ML; MG/100ML; MG/100ML; MG/100ML
50 INJECTION, SOLUTION INTRAVENOUS CONTINUOUS
Status: DISCONTINUED | OUTPATIENT
Start: 2022-01-20 | End: 2022-01-20

## 2022-01-20 RX ORDER — LIDOCAINE HYDROCHLORIDE 10 MG/ML
INJECTION, SOLUTION EPIDURAL; INFILTRATION; INTRACAUDAL; PERINEURAL AS NEEDED
Status: DISCONTINUED | OUTPATIENT
Start: 2022-01-20 | End: 2022-01-20

## 2022-01-20 RX ORDER — PROPOFOL 10 MG/ML
INJECTION, EMULSION INTRAVENOUS AS NEEDED
Status: DISCONTINUED | OUTPATIENT
Start: 2022-01-20 | End: 2022-01-20

## 2022-01-20 RX ADMIN — PROPOFOL 50 MG: 10 INJECTION, EMULSION INTRAVENOUS at 13:45

## 2022-01-20 RX ADMIN — PROPOFOL 50 MG: 10 INJECTION, EMULSION INTRAVENOUS at 13:50

## 2022-01-20 RX ADMIN — PROPOFOL 20 MG: 10 INJECTION, EMULSION INTRAVENOUS at 13:53

## 2022-01-20 RX ADMIN — PROPOFOL 50 MG: 10 INJECTION, EMULSION INTRAVENOUS at 13:41

## 2022-01-20 RX ADMIN — PROPOFOL 150 MG: 10 INJECTION, EMULSION INTRAVENOUS at 13:39

## 2022-01-20 RX ADMIN — PROPOFOL 50 MG: 10 INJECTION, EMULSION INTRAVENOUS at 13:47

## 2022-01-20 RX ADMIN — SODIUM CHLORIDE, SODIUM LACTATE, POTASSIUM CHLORIDE, CALCIUM CHLORIDE 50 ML/HR: 600; 310; 30; 20 INJECTION, SOLUTION INTRAVENOUS at 13:27

## 2022-01-20 RX ADMIN — LIDOCAINE HYDROCHLORIDE 50 MG: 10 INJECTION, SOLUTION EPIDURAL; INFILTRATION; INTRACAUDAL; PERINEURAL at 13:39

## 2022-01-20 RX ADMIN — PROPOFOL 50 MG: 10 INJECTION, EMULSION INTRAVENOUS at 13:43

## 2022-01-20 NOTE — ANESTHESIA POSTPROCEDURE EVALUATION
Post-Op Assessment Note    CV Status:  Stable    Pain management: adequate     Mental Status:  Alert and awake   Hydration Status:  Euvolemic   PONV Controlled:  Controlled   Airway Patency:  Patent      Post Op Vitals Reviewed: Yes      Staff: CRNA, Anesthesiologist         No complications documented      /73 (01/20/22 1400)    Temp      Pulse 92 (01/20/22 1400)   Resp 17 (01/20/22 1400)    SpO2 93 % (01/20/22 1400)

## 2022-01-20 NOTE — ANESTHESIA PREPROCEDURE EVALUATION
Procedure:  COLONOSCOPY    Relevant Problems   CARDIO   (+) Hyperlipidemia      MUSCULOSKELETAL   (+) Backache   (+) Chronic low back pain   (+) Lumbar spondylosis   (+) Myofascial pain syndrome      NEURO/PSYCH   (+) Anxiety disorder   (+) Chronic pain syndrome   (+) Major depression, single episode, in complete remission (HCC)   (+) Myofascial pain syndrome        Physical Exam    Airway    Mallampati score: III  TM Distance: >3 FB  Neck ROM: full     Dental   No notable dental hx     Cardiovascular  Cardiovascular exam normal    Pulmonary  Pulmonary exam normal     Other Findings        Anesthesia Plan  ASA Score- 2     Anesthesia Type- IV sedation with anesthesia with ASA Monitors  Additional Monitors:   Airway Plan:           Plan Factors-    Chart reviewed  Patient summary reviewed  Patient is not a current smoker  Induction- intravenous  Postoperative Plan-     Informed Consent- Anesthetic plan and risks discussed with patient  I personally reviewed this patient with the CRNA  Discussed and agreed on the Anesthesia Plan with the CRNA  Francisco Ramirez

## 2022-01-20 NOTE — H&P
History and Physical - [facility]  Ismael Armstrong 52 y o  female MRN: [id number]      HPI: Ismael Armstrong is a 52y o  year old female who presents for diarrhea  REVIEW OF SYSTEMS: Per the HPI, and otherwise unremarkable      Historical Information   Past Medical History:   Diagnosis Date    Anxiety     Arthritis     Carpal tunnel syndrome     Depression     Heel spur     History of anemia     reports as a child, and with post surgery due to blood loss    Hyperlipidemia     Psychiatric disorder     Radial tunnel syndrome      Past Surgical History:   Procedure Laterality Date    CORE DECOMPRESSION HIP Right     GANGLION CYST EXCISION Right 12/7/2021    Procedure: Right volar radial ganglion cyst excision;  Surgeon: Ting Eason MD;  Location: [facility];  Service: Orthopedics    JOINT REPLACEMENT Right 2016    [facility]    IA WRIST Deborra Levo LIG Right 12/7/2021    Procedure: Right endoscopic carpal tunnel release;  Surgeon: Ting Eason MD;  Location: [facility];  Service: Orthopedics    TOTAL HIP ARTHROPLASTY       Social History   Social History     Substance and Sexual Activity   Alcohol Use No    Comment: Denies     Social History     Substance and Sexual Activity   Drug Use No    Comment: Denies      Social History     Tobacco Use   Smoking Status Never Smoker   Smokeless Tobacco Never Used   Tobacco Comment    Denies      Family History   Problem Relation Age of Onset    Heart disease Father     Glaucoma Father     Diabetes Father     Colon polyps Father     No Known Problems Mother     No Known Problems Maternal Grandmother     No Known Problems Maternal Grandfather     No Known Problems Paternal Grandmother     Colon polyps Paternal Grandfather     No Known Problems Half-Sister     No Known Problems Maternal Aunt     No Known Problems Maternal Aunt     Colon cancer Neg Hx        Meds/Allergies       Current Outpatient Medications:     ascorbic acid (VITAMIN C) 500 MG tablet    Cholecalciferol (Vitamin D3) 50 MCG (2000 UT) capsule    cholestyramine (QUESTRAN) 4 g packet    cyclobenzaprine (FLEXERIL) 5 mg tablet    dicyclomine (BENTYL) 10 mg capsule    DULoxetine (CYMBALTA) 60 mg delayed release capsule    Lidocaine (Lidocaine Pain Relief) 4 % PTCH    naproxen sodium (ALEVE) 220 MG tablet    pyridoxine (VITAMIN B6) 100 mg tablet    Sod Picosulfate-Mag Ox-Cit Acd (Clenpiq) 10-3 5-12 MG-GM -GM/160ML SOLN    traMADol (ULTRAM) 50 mg tablet    traZODone (DESYREL) 100 mg tablet    acetaminophen (TYLENOL) 650 mg CR tablet    Current Facility-Administered Medications:     lactated ringers infusion, 50 mL/hr, Intravenous, Continuous, 50 mL/hr at 01/20/22 1327    No Known Allergies    Objective     /78   Pulse 105   Temp 97 5 °F (36 4 °C) (Temporal)   Resp 15   SpO2 96%       PHYSICAL EXAM    Gen: NAD AAOx3  Head: Normocephalic, Atraumatic  CV: S1S2 RRR no m/r/g  CHEST: Clear b/l no c/r/w  ABD: soft, +BS NT/ND no masses  EXT: no edema      ASSESSMENT/PLAN:  This is a 52y o  year old female here for colonoscopy, and she is stable and optimized for her procedure

## 2022-01-20 NOTE — DISCHARGE INSTRUCTIONS
Microscopic Colitis   WHAT YOU NEED TO KNOW:   What is microscopic colitis? Microscopic colitis is long-term inflammation of your colon (large intestine)  Inflammation can damage the lining of your colon and cause long-term diarrhea  Microscopic colitis may be caused by an infection, higher levels of acid in your colon, or the cause may not be known  What increases my risk for microscopic colitis? · Older age    · Being female    · Increased bile acids    · Family history of microscopic colitis or another colon disease    · Bacterial infection    · Medicines for pain, depression, ulcers, or seizures    · Weakened immune system    What are the signs and symptoms of microscopic colitis? · Diarrhea    · Abdominal pain    · Fatigue     · Nausea or vomiting    · Weight loss    · Loss of bowel movement control    · Dehydration (thirst, dry mouth, or decreased urine)    How is microscopic colitis diagnosed? · Blood tests  may show if you have an infection  · A bowel movement sample  may show what germ is causing your illness  · A colonoscopy  may show what is causing your colitis  A tube with a light and camera on the end will be put into your anus, and moved forward into your intestine  How is microscopic colitis treated? Medicines may be given to treat a bacterial infection, decrease inflammation in your colon, or treat diarrhea  You may also need medicine to decrease acid levels in your colon that could cause irritation  How can I manage my symptoms? · Eat a variety of healthy foods  Healthy foods include fruits, vegetables, whole-grain breads, low-fat dairy products, beans, lean meats, and fish  You may need to eat several small meals throughout the day  Avoid spicy foods, caffeine, chocolate, and foods high in fat  · Drink liquids as directed  to help prevent dehydration  Good liquids to drink include water, juice, and broth  Ask how much liquid to drink each day   You may need to drink an oral rehydration solution (ORS)  An ORS contains a balance of water, salt, and sugar to replace body fluids lost during diarrhea  · Start to exercise when you feel better  Regular exercise helps your bowels work normally  Ask about the best exercise plan for you  · Ask about probiotics  You may need supplements that help balance the bacteria in your colon  This may help decrease you symptoms  How can I help prevent microscopic colitis? · Wash your hands  Wash your hands in warm, soapy water for 20 seconds before and after you handle food  Wash your hands after you use the bathroom, change a diaper, or touch an animal      · Clean food and areas that come in contact with food  Rinse fruits and vegetables in running water  Clean cutting boards, knives, countertops, and other areas where you prepare food before and after you cook  Wash sponges and dishtowels weekly in hot water  · Christiane Aloe food all the way through  Cook eggs until the yolks are firm  Use a meat thermometer to make sure meat is heated to a temperature that will kill bacteria  Do not eat raw or undercooked chicken, turkey, seafood, or meat  · Store food properly  Refrigerate or freeze fruits and vegetables, cooked foods, and leftovers  · Drink safe water  Drink only treated water  Do not drink water from ponds or lakes, or from swimming pools that do not contain chlorine  Drink bottled water when traveling  Call 911 for any of the following:   · You have trouble breathing  When should I seek immediate care? · You have any of the following signs of severe dehydration:     ? Dizziness or weakness    ? Dry mouth, cracked lips, or severe thirst    ? Fast heartbeat or breathing    ? Passing little to no urine    · You have black or bright red stools  · You have blood in your vomit  · Your abdomen feels larger than normal, hard, and painful  When should I contact my healthcare provider?    · You have a fever with abdominal pain that does not go away  · You have a fever, chills, cough, or feel weak and achy  · Your diarrhea gets worse  · Your symptoms do not improve or get worse  · You are losing weight without trying  · You have questions or concerns about your condition or care  CARE AGREEMENT:   You have the right to help plan your care  Learn about your health condition and how it may be treated  Discuss treatment options with your healthcare providers to decide what care you want to receive  You always have the right to refuse treatment  The above information is an  only  It is not intended as medical advice for individual conditions or treatments  Talk to your doctor, nurse or pharmacist before following any medical regimen to see if it is safe and effective for you  © Copyright [employer] 2021 Information is for End User's use only and may not be sold, redistributed or otherwise used for commercial purposes  All illustrations and images included in CareNotes® are the copyrighted property of A D A M , Inc  or [facility] Garry Crawley   Hemorrhoids   WHAT YOU NEED TO KNOW:   What are hemorrhoids? Hemorrhoids are swollen blood vessels inside your rectum (internal hemorrhoids) or on your anus (external hemorrhoids)  Sometimes a hemorrhoid may prolapse  This means it extends out of your anus  What increases my risk for hemorrhoids? · Pregnancy or obesity    · Straining or sitting for a long time during bowel movements    · Liver disease    · Weak muscles around the anus caused by older age, rectal surgery, or anal intercourse    · A lack of physical activity    · Chronic diarrhea or constipation    · A low-fiber diet    What are the signs and symptoms of hemorrhoids?    · Pain or itching around your anus or inside your rectum    · Swelling or bumps around your anus    · Bright red blood in your bowel movement, on the toilet paper, or in the toilet bowl    · Tissue bulging out of your anus (prolapsed hemorrhoids)    · Incontinence (poor control over urine or bowel movements)    How are hemorrhoids diagnosed? Your healthcare provider will ask about your symptoms, the foods you eat, and your bowel movements  He or she will examine your anus for external hemorrhoids  You may need the following:  · A digital rectal exam  is a test to check for hemorrhoids  Your healthcare provider will put a gloved finger inside your anus to feel for the hemorrhoids  · An anoscopy  is a test that uses a scope (small tube with a light and camera on the end) to look at your hemorrhoids  How are hemorrhoids treated? Treatment will depend on your symptoms  You may need any of the following:  · Medicines  can help decrease pain and swelling, and soften your bowel movement  The medicine may be a pill, pad, cream, or ointment  · Procedures  may be used to shrink or remove your hemorrhoid  Examples include rubber-band ligation, sclerotherapy, and photocoagulation  These procedures may be done in your healthcare provider's office  Ask your healthcare provider for more information about these procedures  · Surgery  may be needed to shrink or remove your hemorrhoids  How can I manage my symptoms? · Apply ice on your anus for 15 to 20 minutes every hour or as directed  Use an ice pack, or put crushed ice in a plastic bag  Cover it with a towel before you apply it to your anus  Ice helps prevent tissue damage and decreases swelling and pain  · Take a sitz bath  Fill a bathtub with 4 to 6 inches of warm water  You may also use a sitz bath pan that fits inside a toilet bowl  Sit in the sitz bath for 15 minutes  Do this 3 times a day, and after each bowel movement  The warm water can help decrease pain and swelling  · Keep your anal area clean  Gently wash the area with warm water daily  Soap may irritate the area  After a bowel movement, wipe with moist towelettes or wet toilet paper   Dry toilet paper can irritate the area  How can I help prevent hemorrhoids? · Do not strain to have a bowel movement  Do not sit on the toilet too long  These actions can increase pressure on the tissues in your rectum and anus  · Drink plenty of liquids  Liquids can help prevent constipation  Ask how much liquid to drink each day and which liquids are best for you  · Eat a variety of high-fiber foods  Examples include fruits, vegetables, and whole grains  Ask your healthcare provider how much fiber you need each day  You may need to take a fiber supplement  · Exercise as directed  Exercise, such as walking, may make it easier to have a bowel movement  Ask your healthcare provider to help you create an exercise plan  · Do not have anal sex  Anal sex can weaken the skin around your rectum and anus  · Avoid heavy lifting  This can cause straining and increase your risk for another hemorrhoid  When should I seek immediate care? · You have severe pain in your rectum or around your anus  · You have severe pain in your abdomen and you are vomiting  · You have bleeding from your anus that soaks through your underwear  When should I contact my healthcare provider? · You have frequent and painful bowel movements  · Your hemorrhoid looks or feels more swollen than usual      · You do not have a bowel movement for 2 days or more  · You see or feel tissue coming through your anus  · You have questions or concerns about your condition or care  CARE AGREEMENT:   You have the right to help plan your care  Learn about your health condition and how it may be treated  Discuss treatment options with your healthcare providers to decide what care you want to receive  You always have the right to refuse treatment  The above information is an  only  It is not intended as medical advice for individual conditions or treatments   Talk to your doctor, nurse or pharmacist before following any medical regimen to see if it is safe and effective for you  © Copyright [employer] 2021 Information is for End User's use only and may not be sold, redistributed or otherwise used for commercial purposes  All illustrations and images included in CareNotes® are the copyrighted property of A D A M , Inc  or Yenny Huizar  Colonoscopy   WHAT YOU NEED TO KNOW:   A colonoscopy is a procedure to examine the inside of your colon (intestine) with a scope  Polyps or tissue growths may have been removed during your colonoscopy  It is normal to feel bloated and to have some abdominal discomfort  You should be passing gas  If you have hemorrhoids or you had polyps removed, you may have a small amount of bleeding  DISCHARGE INSTRUCTIONS:   Seek care immediately if:    You have sudden, severe abdominal pain   You have problems swallowing   You have a large amount of black, sticky bowel movements or blood in your bowel movements   You have sudden trouble breathing   You feel weak, lightheaded, or faint or your heart beats faster than normal for you  Contact your healthcare provider if:    You have a fever and chills   You have nausea or are vomiting   Your abdomen is bloated or feels full and hard   You have abdominal pain   You have black, sticky bowel movements or blood in your bowel movements   You have not had a bowel movement for 3 days after your procedure   You have rash or hives   You have questions or concerns about your procedure  Activity:    Do not lift, strain, or run for 24 hours after your procedure   Rest after your procedure  You have been given medicine to relax you  Do not drive or make important decisions until the day after your procedure  Return to your normal activity as directed   Relieve gas and discomfort from bloating by lying on your right side with a heating pad on your abdomen   You may need to take short walks to help the gas move out  Eat small meals until bloating is relieved  Follow up with your healthcare provider as directed: Write down your questions so you remember to ask them during your visits  If you take a blood thinner, please review the specific instructions from your endoscopist about when you should resume it  These can be found in the Recommendation and Your Medication list sections of this After Visit Summary

## 2022-01-26 ENCOUNTER — TELEPHONE (OUTPATIENT)
Dept: GASTROENTEROLOGY | Facility: CLINIC | Age: 47
End: 2022-01-26

## 2022-01-26 DIAGNOSIS — B83.9 HELMINTH INFECTION: Primary | ICD-10-CM

## 2022-01-26 NOTE — TELEPHONE ENCOUNTER
Pt left VM mssg stating she rec'd Dr Collins Joshi call; she left a detailed mssg about orders for blood work and stool test  She has already done a lot of testing/would like to have a conversation w/ ADITYA  CB# [phone]

## 2022-01-26 NOTE — TELEPHONE ENCOUNTER
Pt was seen by Jennifer in the office  From colonoscopy perspective - normal colon, biopsies showing eosinophilic colitis  I called pt, no answer, left VM  Ddx: primary idiopathic EC vs helminthic infections vs IBD and other autoimmune conditions  Celiac serology is negative  - Recommend checking ova and parasites x3    - Given elev fecal michael, and no evidence of obvious IBD on biopsies, would try a course of budesonide to see if diarrhea responds  This should be done after O&P ruled out  - She does not have a f/u w Jennifer in the office so please call pt to schedule  Thanks

## 2022-01-26 NOTE — TELEPHONE ENCOUNTER
Pt left VM mssg stating she needs next steps after colonoscopy w/ bx; GS suggested CT or US of pancreas; req CB ASAP to [phone]

## 2022-01-26 NOTE — TELEPHONE ENCOUNTER
Spoke to patient in regards to her follow-up colonoscopy and stool studies  Discussed patient's lab work and colonoscopy with Dr Zac Parsons  Placed order for stools for ova and parasites x3  As soon as specimens are collected will begin a course of Budesonide  Patient states she continues to have diarrhea daily  She is on unable to take the Questran at this time

## 2022-01-31 ENCOUNTER — APPOINTMENT (OUTPATIENT)
Dept: LAB | Facility: HOSPITAL | Age: 47
End: 2022-01-31
Payer: COMMERCIAL

## 2022-01-31 PROCEDURE — 87209 SMEAR COMPLEX STAIN: CPT

## 2022-01-31 PROCEDURE — 87177 OVA AND PARASITES SMEARS: CPT

## 2022-02-04 LAB
O+P STL CONC: NORMAL

## 2022-02-07 NOTE — TELEPHONE ENCOUNTER
Spoke to patient in regards to Dr Amalia Spears request for treatment with budesonide for eosinophilic colitis  Will prescribe 9 mg of budesonide daily for 2 weeks and instructed patient to contact the office, can request Dr Ladi Fowler and let her know how she is doing  At that time it will bedetermined further treatment plan  Also discussed low pancreatic elastase which Dr Ladi Fowler recommended holding off treatment to see how the current treatment plan works for the patient

## 2022-02-07 NOTE — TELEPHONE ENCOUNTER
Pt called to check on O & P results  Negative x3  Please prescribe budesonide if still indicated  Uses [facility]  Wants to know if she is supposed to continue Questran and Bentyl  States she has had some improvement in diarrhea  No longer urgent watery diarrhea  Now is more "flaky" and floats to top of toilet bowl  Does have very loud stomach growling though  Pt will be scheduling f/u appt but prefers to not schedule with HS

## 2022-02-15 ENCOUNTER — TELEPHONE (OUTPATIENT)
Dept: GASTROENTEROLOGY | Facility: CLINIC | Age: 47
End: 2022-02-15

## 2022-02-15 DIAGNOSIS — K52.82 EOSINOPHILIC COLITIS: ICD-10-CM

## 2022-02-15 NOTE — TELEPHONE ENCOUNTER
Pt calling for next-step instructions; is on Budesonide/will be out in a day or two; does she continue and/or taper? CB# [phone]

## 2022-02-15 NOTE — TELEPHONE ENCOUNTER
I called patient to obtain symptom update  She feels improvement  Not needing to use much bentyl throughout the day  Takes Bentyl at [address]  May take during the day before a meal ( if she knows it may bother her stomach)  Moving bowels once daily, with the exception of ice cream (she's aware she's lactose intolerant) She is not willing to give it up  I recommended Lactaid if she's going to eat it  Fluffy stool # 6 on the stool chart, definitely more formed and less cramping  Patient has requested not to speak with Herm, nor does she want a follow up with him  She would prefer a callback from Dr [facility]NELI[facility] or at least an explanation re: diagnosis  eosinophilc colitis, causes, treatment- how long? What should she expect for the rest of her life?   [phone]

## 2022-02-16 NOTE — TELEPHONE ENCOUNTER
Called patient  No answer  Left voicemail stating the following  Eosinophilic colitis, no obvious trigger at this point for her  May be a type of microscopic colitis  Ova and parasites were negative  Likely disease course will be a flaring in remission type  Budesonide will be used to treat when it flares  At this point, as she is feeling better, I advised she continues dairy free diet and okay to stop the budesonide  She will need a follow-up office visit with me to see how she is doing  The goal would be that when she flares, we will use the budesonide, but only for short bursts  Please schedule her a follow-up office visit with me next available slot

## 2022-02-16 NOTE — TELEPHONE ENCOUNTER
Pt left VM mssg stating she is returning GS's call/missed it/she is at work; asks her to [facility]  No CB left  On phone log call came from [phone]  Ret'd call/asked if she rec'd GS's mssg  Pt did rec mssg; will make appt  Transf'd call to  to make appt

## 2022-02-23 NOTE — TELEPHONE ENCOUNTER
I'd see how she does with just the bentyl alone  If no diarrhea, agree with holding questran  Let's hold off on budesonide at this point

## 2022-02-23 NOTE — TELEPHONE ENCOUNTER
Let's try 30 minutes before meals bid for now and if pain is improved, then can consider taking it as needed after about 2 weeks

## 2022-02-23 NOTE — TELEPHONE ENCOUNTER
Pt left [facility] stating she has questions and an update on her condition/asks for CB ASAP  Per phone log call from [phone]

## 2022-02-23 NOTE — TELEPHONE ENCOUNTER
Pt notified and is agreeable to just take the Bentyl  She does question your advice on how to take the Bentyl  Other than being lactose intolerant she does not have any trigger foods   Should she take the Bentyl once or twice everyday  before her largest meals or just use as needed if has cramping after a meal?  Thank you

## 2022-02-23 NOTE — TELEPHONE ENCOUNTER
Called pt back, she stopped her Budesonide as suggested and took her last dose on 2/20  She felt fine 2/21 but last night after dinner had abdominal cramping so she took the Budesonide again and Bentyl  The cramping then went away after 30 minutes  Denies fever, nausea, vomiting, rectal bleeding or diarrhea  She states is more on the constipated side as has not had a bowel movement for a few days  She states this is her normal as is on Tramadol  She has been off the Questran for 1 week  Today pt had 2 pieces of toast and feels well  Pt is questioning if should resume the Budesonide at all or take Bentyl with every meal or just prn  Pt has a follow up scheduled 3/31 with Dr Yara Gray

## 2022-03-14 NOTE — TELEPHONE ENCOUNTER
Pt calling to f/u; I noted call was forwarded to Herm  Pt notes she is dealing w/ GS  I noted GS off today  Pt OK w/ Herm dealing w/ med ques/prefers CB from nurse [phone]; adds sx like in the beginning

## 2022-03-14 NOTE — TELEPHONE ENCOUNTER
After discussing with Jennifer, it was decided Dr Heide Conroy would be the best one to address pt's concerns tomorrow if pt is agreeable to that  Otherwise Jennifer offered to call this pt today  Called pt, she is agreeable to have Dr Heide Conroy call her tomorrow to discuss her present flare and the medications that will be needed    [phone]

## 2022-03-14 NOTE — TELEPHONE ENCOUNTER
Pt left  mssg stating she has had flare for the last wk; would like to request another round of Budesonide; tried Bentyl and Questran-help a little but still sx  CB# [phone]

## 2022-03-16 RX ORDER — BUDESONIDE 3 MG/1
9 CAPSULE, COATED PELLETS ORAL DAILY
Qty: 90 CAPSULE | Refills: 0 | Status: SHIPPED | OUTPATIENT
Start: 2022-03-16 | End: 2022-04-07

## 2022-03-16 NOTE — TELEPHONE ENCOUNTER
I spoke with patient, she has a follow up appt 3/31/22 at 1 pm with Dr Julissa roth  Prescription for budesonide 9 mg daily submitted to [facility]

## 2022-03-25 ENCOUNTER — OFFICE VISIT (OUTPATIENT)
Dept: GASTROENTEROLOGY | Facility: CLINIC | Age: 47
End: 2022-03-25
Payer: COMMERCIAL

## 2022-03-25 VITALS
WEIGHT: 233 LBS | TEMPERATURE: 95 F | SYSTOLIC BLOOD PRESSURE: 100 MMHG | BODY MASS INDEX: 38.82 KG/M2 | HEIGHT: 65 IN | DIASTOLIC BLOOD PRESSURE: 90 MMHG

## 2022-03-25 DIAGNOSIS — R19.7 DIARRHEA, UNSPECIFIED TYPE: ICD-10-CM

## 2022-03-25 DIAGNOSIS — K52.82 EOSINOPHILIC COLITIS: ICD-10-CM

## 2022-03-25 DIAGNOSIS — R14.0 BLOATING: Primary | ICD-10-CM

## 2022-03-25 PROCEDURE — 99214 OFFICE O/P EST MOD 30 MIN: CPT | Performed by: INTERNAL MEDICINE

## 2022-03-25 NOTE — PROGRESS NOTES
[facility] - Outpatient Consultation  Pedrito Mcgee 52 y o  female MRN: [id number]  Encounter: [id number]          ASSESSMENT AND PLAN:      1  Bloating  - Small intestinal bacterial overgrowth  - EGD; Future    2  Eosinophilic colitis    3  Diarrhea, unspecified type    35-year-old female with chronic diarrhea and bloating presenting for follow-up regarding ongoing symptoms  Her symptoms are likely multifactorial including possibly eosinophilic colitis, IBS, small intestinal bacterial overgrowth  Given the a abdominal pain and severe bloating, will plan for upper endoscopy to rule out other etiologies for her symptoms as well as to rule out eosinophilic esophagitis, gastritis, enteritis  She does have mild dysphagia and would like to get proximal and distal biopsies to rule out EOE  Will also plan for breath test to rule out SIBO given the pronounced bloating as a contributor to his symptoms  If her symptoms are primarily from eosinophilic colitis, would recommend that she remain on budesonide for a longer period of time with a slow taper that we discussed in the office today  If she continues to not have any improvement on budesonide in the next few weeks will trial bismuth in addition  She will follow-up after upper endoscopy    ______________________________________________________________________    HPI:  Patient is a 51 yo female here for ongoing evaluation of diarrhea, previous weight loss, and significant bloating  She had had initial evaluation at [facility] which included stool studies as well as colonoscopy  On stool study she had a low pancreatic elastase but normal fecal fat content  Her fecal calprotectin was elevated to roughly 280  Celiac disease panel was negative    She underwent colonoscopy which endoscopically appeared normal including terminal ileal intubation however random biopsies demonstrated intraepithelial eosinophils and increased lymphoplasmacytic inflammation in lamina propria  I have personally reviewed that colonoscopy from 01/20/2022  After this was initially found she was started on budesonide which did initially treat her symptoms well with reduced diarrhea and cramping  However symptoms returned and she was restarted on budesonide and has been on this for the past 10 days without any improvement at all  In addition she had been given Bentyl which she has taken with meals and in the evenings which does help control the severe cramping pain  She is concerned that she has not had a complete response to budesonide as she did the 1st time  She continues to have multiple liquid bowel movements common worsened postprandially  Her other concern is the persistent severe bloating  She denies any blood in her stool  REVIEW OF SYSTEMS:    CONSTITUTIONAL: Denies any fever, chills, rigors, and weight loss  HEENT: Denies odynophagia, tinnitus  CARDIOVASCULAR: No chest pain or palpitations  RESPIRATORY: Denies any cough, hemoptysis, shortness of breath or dyspnea on exertion  GASTROINTESTINAL: As noted in the History of Present Illness  GENITOURINARY: No problems with urination  Denies any hematuria or dysuria  NEUROLOGIC: No dizziness or vertigo, denies headaches  MUSCULOSKELETAL: Denies any muscle or joint pain  SKIN: Denies skin rashes or itching  ENDOCRINE:  Denies intolerance to heat or cold  PSYCHOSOCIAL: Denies depression or anxiety  Denies any recent memory loss         Historical Information   Past Medical History:   Diagnosis Date    Anxiety     Arthritis     Carpal tunnel syndrome     Depression     Heel spur     History of anemia     reports as a child, and with post surgery due to blood loss    Hyperlipidemia     Psychiatric disorder     Radial tunnel syndrome      Past Surgical History:   Procedure Laterality Date    CORE DECOMPRESSION HIP Right     GANGLION CYST EXCISION Right 12/7/2021    Procedure: Right volar radial ganglion cyst excision;  Surgeon: Jearldine Goodell, MD;  [facility];  Service: Orthopedics    JOINT REPLACEMENT Right 2016    [facility]    HI WRIST Hershall Phylicia LIG Right 12/7/2021    Procedure: Right endoscopic carpal tunnel release;  Surgeon: Jearldine Goodell, MD;  [facility];  Service: Orthopedics    TOTAL HIP ARTHROPLASTY       Social History   Social History     Substance and Sexual Activity   Alcohol Use No    Comment: Denies     Social History     Substance and Sexual Activity   Drug Use No    Comment: Denies      Social History     Tobacco Use   Smoking Status Never Smoker   Smokeless Tobacco Never Used   Tobacco Comment    Denies      Family History   Problem Relation Age of Onset    Heart disease Father     Glaucoma Father     Diabetes Father     Colon polyps Father     No Known Problems Mother     No Known Problems Maternal Grandmother     No Known Problems Maternal Grandfather     No Known Problems Paternal Grandmother     Colon polyps Paternal Grandfather     No Known Problems Half-Sister     No Known Problems Maternal Aunt     No Known Problems Maternal Aunt     Colon cancer Neg Hx        Meds/Allergies       Current Outpatient Medications:     acetaminophen (TYLENOL) 650 mg CR tablet    ascorbic acid (VITAMIN C) 500 MG tablet    budesonide (ENTOCORT EC) 3 MG capsule    Cholecalciferol (Vitamin D3) 50 MCG (2000 UT) capsule    cholestyramine (QUESTRAN) 4 g packet    cyclobenzaprine (FLEXERIL) 5 mg tablet    dicyclomine (BENTYL) 10 mg capsule    DULoxetine (CYMBALTA) 60 mg delayed release capsule    Lidocaine (Lidocaine Pain Relief) 4 % PTCH    metFORMIN HCl  MG/5ML SRER    naproxen sodium (ALEVE) 220 MG tablet    traMADol (ULTRAM) 50 mg tablet    traZODone (DESYREL) 100 mg tablet    pyridoxine (VITAMIN B6) 100 mg tablet    No Known Allergies        Objective     Blood pressure 100/90, temperature (!) 95 °F (35 °C), temperature source Tympanic, height 5' 5" (1 651 m), weight 106 kg (233 lb)  Body mass index is 38 77 kg/m²  PHYSICAL EXAM:      General Appearance:   Alert, cooperative, no distress   HEENT:   Normocephalic, atraumatic, anicteric  Neck:  Supple, symmetrical, trachea midline   Lungs:   Clear to auscultation bilaterally; no rales, rhonchi or wheezing; respirations unlabored    Heart[de-identified]   Regular rate and rhythm; no murmur, rub, or gallop  Abdomen:   Soft, non-tender, non-distended; normal bowel sounds; no masses, no organomegaly    Genitalia:   Deferred    Rectal:   Deferred    Extremities:  No cyanosis, clubbing or edema    Pulses:  2+ and symmetric    Skin:  No jaundice, rashes, or lesions          Lab Results:   No visits with results within 1 Day(s) from this visit  Latest known visit with results is:   Telephone on 01/26/2022   Component Date Value    Ova + Parasite Exam 01/31/2022 No ova, cysts, or parasites seen     One negative specimen does not rule out the possibility of a  parasitic infection   Ova + Parasite Exam 01/31/2022 No ova, cysts, or parasites seen     One negative specimen does not rule out the possibility of a  parasitic infection   Ova + Parasite Exam 01/31/2022 No ova, cysts, or parasites seen     One negative specimen does not rule out the possibility of a  parasitic infection  Radiology Results:   No results found

## 2022-03-25 NOTE — H&P (VIEW-ONLY)
[facility] - Outpatient Consultation  Betina Lopez 52 y o  female MRN: [id number]  Encounter: [id number]          ASSESSMENT AND PLAN:      1  Bloating  - Small intestinal bacterial overgrowth  - EGD; Future    2  Eosinophilic colitis    3  Diarrhea, unspecified type    43-year-old female with chronic diarrhea and bloating presenting for follow-up regarding ongoing symptoms  Her symptoms are likely multifactorial including possibly eosinophilic colitis, IBS, small intestinal bacterial overgrowth  Given the a abdominal pain and severe bloating, will plan for upper endoscopy to rule out other etiologies for her symptoms as well as to rule out eosinophilic esophagitis, gastritis, enteritis  She does have mild dysphagia and would like to get proximal and distal biopsies to rule out EOE  Will also plan for breath test to rule out SIBO given the pronounced bloating as a contributor to his symptoms  If her symptoms are primarily from eosinophilic colitis, would recommend that she remain on budesonide for a longer period of time with a slow taper that we discussed in the office today  If she continues to not have any improvement on budesonide in the next few weeks will trial bismuth in addition  She will follow-up after upper endoscopy    ______________________________________________________________________    HPI:  Patient is a 53 yo female here for ongoing evaluation of diarrhea, previous weight loss, and significant bloating  She had had initial evaluation at [facility] which included stool studies as well as colonoscopy  On stool study she had a low pancreatic elastase but normal fecal fat content  Her fecal calprotectin was elevated to roughly 280  Celiac disease panel was negative    She underwent colonoscopy which endoscopically appeared normal including terminal ileal intubation however random biopsies demonstrated intraepithelial eosinophils and increased lymphoplasmacytic inflammation in lamina propria  I have personally reviewed that colonoscopy from 01/20/2022  After this was initially found she was started on budesonide which did initially treat her symptoms well with reduced diarrhea and cramping  However symptoms returned and she was restarted on budesonide and has been on this for the past 10 days without any improvement at all  In addition she had been given Bentyl which she has taken with meals and in the evenings which does help control the severe cramping pain  She is concerned that she has not had a complete response to budesonide as she did the 1st time  She continues to have multiple liquid bowel movements common worsened postprandially  Her other concern is the persistent severe bloating  She denies any blood in her stool  REVIEW OF SYSTEMS:    CONSTITUTIONAL: Denies any fever, chills, rigors, and weight loss  HEENT: Denies odynophagia, tinnitus  CARDIOVASCULAR: No chest pain or palpitations  RESPIRATORY: Denies any cough, hemoptysis, shortness of breath or dyspnea on exertion  GASTROINTESTINAL: As noted in the History of Present Illness  GENITOURINARY: No problems with urination  Denies any hematuria or dysuria  NEUROLOGIC: No dizziness or vertigo, denies headaches  MUSCULOSKELETAL: Denies any muscle or joint pain  SKIN: Denies skin rashes or itching  ENDOCRINE:  Denies intolerance to heat or cold  PSYCHOSOCIAL: Denies depression or anxiety  Denies any recent memory loss         Historical Information   Past Medical History:   Diagnosis Date    Anxiety     Arthritis     Carpal tunnel syndrome     Depression     Heel spur     History of anemia     reports as a child, and with post surgery due to blood loss    Hyperlipidemia     Psychiatric disorder     Radial tunnel syndrome      Past Surgical History:   Procedure Laterality Date    CORE DECOMPRESSION HIP Right     GANGLION CYST EXCISION Right 12/7/2021    Procedure: Right volar radial ganglion cyst excision;  Surgeon: Dg Almanzar MD;  [facility];  Service: Orthopedics    JOINT REPLACEMENT Right 2016    [facility]    SD WRIST Sabiha Paris LIG Right 12/7/2021    Procedure: Right endoscopic carpal tunnel release;  Surgeon: Dg Almanzar MD;  [facility];  Service: Orthopedics    TOTAL HIP ARTHROPLASTY       Social History   Social History     Substance and Sexual Activity   Alcohol Use No    Comment: Denies     Social History     Substance and Sexual Activity   Drug Use No    Comment: Denies      Social History     Tobacco Use   Smoking Status Never Smoker   Smokeless Tobacco Never Used   Tobacco Comment    Denies      Family History   Problem Relation Age of Onset    Heart disease Father     Glaucoma Father     Diabetes Father     Colon polyps Father     No Known Problems Mother     No Known Problems Maternal Grandmother     No Known Problems Maternal Grandfather     No Known Problems Paternal Grandmother     Colon polyps Paternal Grandfather     No Known Problems Half-Sister     No Known Problems Maternal Aunt     No Known Problems Maternal Aunt     Colon cancer Neg Hx        Meds/Allergies       Current Outpatient Medications:     acetaminophen (TYLENOL) 650 mg CR tablet    ascorbic acid (VITAMIN C) 500 MG tablet    budesonide (ENTOCORT EC) 3 MG capsule    Cholecalciferol (Vitamin D3) 50 MCG (2000 UT) capsule    cholestyramine (QUESTRAN) 4 g packet    cyclobenzaprine (FLEXERIL) 5 mg tablet    dicyclomine (BENTYL) 10 mg capsule    DULoxetine (CYMBALTA) 60 mg delayed release capsule    Lidocaine (Lidocaine Pain Relief) 4 % PTCH    metFORMIN HCl  MG/5ML SRER    naproxen sodium (ALEVE) 220 MG tablet    traMADol (ULTRAM) 50 mg tablet    traZODone (DESYREL) 100 mg tablet    pyridoxine (VITAMIN B6) 100 mg tablet    No Known Allergies        Objective     Blood pressure 100/90, temperature (!) 95 °F (35 °C), temperature source Tympanic, height 5' 5" (1 651 m), weight 106 kg (233 lb)  Body mass index is 38 77 kg/m²  PHYSICAL EXAM:      General Appearance:   Alert, cooperative, no distress   HEENT:   Normocephalic, atraumatic, anicteric  Neck:  Supple, symmetrical, trachea midline   Lungs:   Clear to auscultation bilaterally; no rales, rhonchi or wheezing; respirations unlabored    Heart[de-identified]   Regular rate and rhythm; no murmur, rub, or gallop  Abdomen:   Soft, non-tender, non-distended; normal bowel sounds; no masses, no organomegaly    Genitalia:   Deferred    Rectal:   Deferred    Extremities:  No cyanosis, clubbing or edema    Pulses:  2+ and symmetric    Skin:  No jaundice, rashes, or lesions          Lab Results:   No visits with results within 1 Day(s) from this visit  Latest known visit with results is:   Telephone on 01/26/2022   Component Date Value    Ova + Parasite Exam 01/31/2022 No ova, cysts, or parasites seen     One negative specimen does not rule out the possibility of a  parasitic infection   Ova + Parasite Exam 01/31/2022 No ova, cysts, or parasites seen     One negative specimen does not rule out the possibility of a  parasitic infection   Ova + Parasite Exam 01/31/2022 No ova, cysts, or parasites seen     One negative specimen does not rule out the possibility of a  parasitic infection  Radiology Results:   No results found

## 2022-03-25 NOTE — PATIENT INSTRUCTIONS
EGD scheduled for 4/15 with Dr Linda García at [facility] provided instructions/ paper work given

## 2022-03-30 ENCOUNTER — NURSE TRIAGE (OUTPATIENT)
Dept: OTHER | Facility: OTHER | Age: 47
End: 2022-03-30

## 2022-03-30 NOTE — TELEPHONE ENCOUNTER
Regarding: not abdorbing medication  ----- Message from [facility] sent at 3/30/2022 12:05 PM EDT -----  "I do not believe I am absorbing budesonide (ENTOCORT EC) 3 MG capsule    I keep finding capsules in the toilet "

## 2022-03-30 NOTE — TELEPHONE ENCOUNTER
Patient's last seen by Luetta Cheadle  She canceled future appointments with us and has a follow-up scoping with them  Please forward  Thank you

## 2022-03-30 NOTE — TELEPHONE ENCOUNTER
Please call patient to advise  Reason for Disposition   [1] Caller requesting NON-URGENT health information AND [2] PCP's office is the best resource    Answer Assessment - Initial Assessment Questions  1  REASON FOR CALL or QUESTION: "What is your reason for calling today?" or "How can I best help you?" or "What question do you have that I can help answer?"      Patient does not believe she is absorbing her Budesonide  She keeps finding capsules in the toilet      Protocols used: INFORMATION ONLY CALL - [facility] TRIAGE[facility]

## 2022-04-01 ENCOUNTER — NURSE TRIAGE (OUTPATIENT)
Dept: OTHER | Facility: OTHER | Age: 47
End: 2022-04-01

## 2022-04-01 NOTE — TELEPHONE ENCOUNTER
Regarding: budesonide (ENTOCORT EC) 3 MG capsule  ----- Message from [employer] [facility] sent at 4/1/2022  8:31 AM EDT -----  "I do not believe I am absorbing budesonide (ENTOCORT EC) 3 MG capsule    I keep finding capsules in the toilet "

## 2022-04-01 NOTE — TELEPHONE ENCOUNTER
Patient called in to report from the time she administers budesonide to an episode of diarrhea is so short that she feels the capsules are not being absorbed and eliminated  Initial report was on 3/30/22  Please follow up with patient

## 2022-04-01 NOTE — TELEPHONE ENCOUNTER
Called patient to go confirm if she is seeing the budesonide capsule or a different medication - left message for her to call back

## 2022-04-01 NOTE — TELEPHONE ENCOUNTER
Reason for Disposition   Nursing judgment    Answer Assessment - Initial Assessment Questions  1  REASON FOR CALL or QUESTION: "What is your reason for calling today?" or "How can I best help you?" or "What question do you have that I can help answer?"      Patient reports she feels the from the time she administers her budesonide to an episode of diarrhea is so short that the capsule is elimiated      Protocols used: INFORMATION ONLY CALL - NO TRIAGE-ADULT-[facility]

## 2022-04-07 DIAGNOSIS — K52.82 EOSINOPHILIC COLITIS: ICD-10-CM

## 2022-04-07 RX ORDER — BUDESONIDE 3 MG/1
9 CAPSULE, COATED PELLETS ORAL DAILY
Qty: 90 CAPSULE | Refills: 0 | Status: SHIPPED | OUTPATIENT
Start: 2022-04-07 | End: 2022-05-01

## 2022-04-15 ENCOUNTER — ANESTHESIA EVENT (OUTPATIENT)
Dept: GASTROENTEROLOGY | Facility: HOSPITAL | Age: 47
End: 2022-04-15

## 2022-04-15 ENCOUNTER — ANESTHESIA (OUTPATIENT)
Dept: GASTROENTEROLOGY | Facility: HOSPITAL | Age: 47
End: 2022-04-15

## 2022-04-15 ENCOUNTER — HOSPITAL ENCOUNTER (OUTPATIENT)
Dept: GASTROENTEROLOGY | Facility: HOSPITAL | Age: 47
Setting detail: OUTPATIENT SURGERY
Discharge: HOME | End: 2022-04-15
Payer: COMMERCIAL

## 2022-04-15 VITALS
RESPIRATION RATE: 16 BRPM | OXYGEN SATURATION: 98 % | WEIGHT: 233 LBS | SYSTOLIC BLOOD PRESSURE: 115 MMHG | HEIGHT: 65 IN | HEART RATE: 85 BPM | DIASTOLIC BLOOD PRESSURE: 65 MMHG | BODY MASS INDEX: 38.82 KG/M2 | TEMPERATURE: 97.2 F

## 2022-04-15 DIAGNOSIS — R14.0 BLOATING: ICD-10-CM

## 2022-04-15 LAB
EXT PREGNANCY TEST URINE: NEGATIVE
EXT. CONTROL: NORMAL

## 2022-04-15 PROCEDURE — 88341 IMHCHEM/IMCYTCHM EA ADD ANTB: CPT | Performed by: PATHOLOGY

## 2022-04-15 PROCEDURE — 88305 TISSUE EXAM BY PATHOLOGIST: CPT | Performed by: PATHOLOGY

## 2022-04-15 PROCEDURE — 43239 EGD BIOPSY SINGLE/MULTIPLE: CPT | Performed by: INTERNAL MEDICINE

## 2022-04-15 PROCEDURE — 88342 IMHCHEM/IMCYTCHM 1ST ANTB: CPT | Performed by: PATHOLOGY

## 2022-04-15 PROCEDURE — 81025 URINE PREGNANCY TEST: CPT | Performed by: STUDENT IN AN ORGANIZED HEALTH CARE EDUCATION/TRAINING PROGRAM

## 2022-04-15 RX ORDER — OMEPRAZOLE 20 MG/1
20 CAPSULE, DELAYED RELEASE ORAL DAILY
Qty: 30 CAPSULE | Refills: 0 | Status: SHIPPED | OUTPATIENT
Start: 2022-04-15 | End: 2022-05-07

## 2022-04-15 RX ORDER — LIDOCAINE HYDROCHLORIDE 20 MG/ML
INJECTION, SOLUTION EPIDURAL; INFILTRATION; INTRACAUDAL; PERINEURAL AS NEEDED
Status: DISCONTINUED | OUTPATIENT
Start: 2022-04-15 | End: 2022-04-15

## 2022-04-15 RX ORDER — PROPOFOL 10 MG/ML
INJECTION, EMULSION INTRAVENOUS AS NEEDED
Status: DISCONTINUED | OUTPATIENT
Start: 2022-04-15 | End: 2022-04-15

## 2022-04-15 RX ORDER — SODIUM CHLORIDE, SODIUM LACTATE, POTASSIUM CHLORIDE, CALCIUM CHLORIDE 600; 310; 30; 20 MG/100ML; MG/100ML; MG/100ML; MG/100ML
125 INJECTION, SOLUTION INTRAVENOUS CONTINUOUS
Status: DISCONTINUED | OUTPATIENT
Start: 2022-04-15 | End: 2022-04-19 | Stop reason: HOSPADM

## 2022-04-15 RX ADMIN — PROPOFOL 30 MG: 10 INJECTION, EMULSION INTRAVENOUS at 09:46

## 2022-04-15 RX ADMIN — LIDOCAINE HYDROCHLORIDE 5 ML: 20 INJECTION, SOLUTION EPIDURAL; INFILTRATION; INTRACAUDAL; PERINEURAL at 09:43

## 2022-04-15 RX ADMIN — SODIUM CHLORIDE, SODIUM LACTATE, POTASSIUM CHLORIDE, AND CALCIUM CHLORIDE 125 ML/HR: .6; .31; .03; .02 INJECTION, SOLUTION INTRAVENOUS at 08:52

## 2022-04-15 RX ADMIN — PROPOFOL 30 MG: 10 INJECTION, EMULSION INTRAVENOUS at 09:48

## 2022-04-15 RX ADMIN — PROPOFOL 200 MG: 10 INJECTION, EMULSION INTRAVENOUS at 09:45

## 2022-04-15 NOTE — ANESTHESIA PREPROCEDURE EVALUATION
Procedure:  EGD    Relevant Problems   CARDIO   (+) Hyperlipidemia      MUSCULOSKELETAL   (+) Backache   (+) Chronic low back pain   (+) Lumbar spondylosis   (+) Myofascial pain syndrome      NEURO/PSYCH   (+) Anxiety disorder   (+) Chronic low back pain   (+) Chronic pain syndrome   (+) Major depression, single episode, in complete remission (HCC)   (+) Myofascial pain syndrome        Physical Exam    Airway    Mallampati score: I  TM Distance: >3 FB  Neck ROM: full     Dental   No notable dental hx     Cardiovascular  Cardiovascular exam normal    Pulmonary  Pulmonary exam normal     Other Findings        Anesthesia Plan  ASA Score- 1     Anesthesia Type- IV sedation with anesthesia with ASA Monitors  Additional Monitors:   Airway Plan:           Plan Factors-Exercise tolerance (METS): >4 METS  Chart reviewed  Patient summary reviewed  Patient is not a current smoker  Induction- intravenous  Postoperative Plan-     Informed Consent- Anesthetic plan and risks discussed with patient and spouse  I personally reviewed this patient with the CRNA  Discussed and agreed on the Anesthesia Plan with the CRNA  Chelsi Thakkar is a 52 y o  female presenting today for EGD   History of anesthesia: no issues  NPO since appropriate  Denies N/V, F, chills, CP, SOB  AQA       Plan for MAC

## 2022-04-15 NOTE — ANESTHESIA POSTPROCEDURE EVALUATION
Post-Op Assessment Note    CV Status:  Stable    Pain management: adequate     Mental Status:  Sleepy   Hydration Status:  Euvolemic   PONV Controlled:  Controlled   Airway Patency:  Patent      Post Op Vitals Reviewed: Yes            No complications documented      BP   119/67   Temp     Pulse  70   Resp   24   SpO2   98

## 2022-04-15 NOTE — INTERVAL H&P NOTE
H&P reviewed  After examining the patient I find no changes in the patients condition since the H&P had been written      Vitals:    04/15/22 0831   BP: 107/76   Pulse: 91   Resp: 18   Temp: (!) 97 °F (36 1 °C)   SpO2: 97%

## 2022-04-26 DIAGNOSIS — J01.90 ACUTE NON-RECURRENT SINUSITIS, UNSPECIFIED LOCATION: Primary | ICD-10-CM

## 2022-04-26 DIAGNOSIS — H66.002 NON-RECURRENT ACUTE SUPPURATIVE OTITIS MEDIA OF LEFT EAR WITHOUT SPONTANEOUS RUPTURE OF TYMPANIC MEMBRANE: Primary | ICD-10-CM

## 2022-04-26 DIAGNOSIS — J01.90 ACUTE NON-RECURRENT SINUSITIS, UNSPECIFIED LOCATION: ICD-10-CM

## 2022-04-26 RX ORDER — AZITHROMYCIN 250 MG/1
TABLET, FILM COATED ORAL
Qty: 6 TABLET | Refills: 0 | Status: SHIPPED | OUTPATIENT
Start: 2022-04-26 | End: 2022-04-30

## 2022-04-26 RX ORDER — AMOXICILLIN AND CLAVULANATE POTASSIUM 875; 125 MG/1; MG/1
1 TABLET, FILM COATED ORAL EVERY 12 HOURS SCHEDULED
Qty: 14 TABLET | Refills: 0 | Status: SHIPPED | OUTPATIENT
Start: 2022-04-26 | End: 2022-04-26 | Stop reason: CLARIF

## 2022-04-30 DIAGNOSIS — K52.82 EOSINOPHILIC COLITIS: ICD-10-CM

## 2022-05-01 RX ORDER — BUDESONIDE 3 MG/1
9 CAPSULE, COATED PELLETS ORAL DAILY
Qty: 90 CAPSULE | Refills: 0 | Status: SHIPPED | OUTPATIENT
Start: 2022-05-01 | End: 2022-05-25

## 2022-05-07 DIAGNOSIS — R14.0 BLOATING: ICD-10-CM

## 2022-05-07 RX ORDER — OMEPRAZOLE 20 MG/1
CAPSULE, DELAYED RELEASE ORAL
Qty: 30 CAPSULE | Refills: 0 | Status: SHIPPED | OUTPATIENT
Start: 2022-05-07 | End: 2022-05-31

## 2022-05-25 DIAGNOSIS — K52.82 EOSINOPHILIC COLITIS: ICD-10-CM

## 2022-05-25 RX ORDER — BUDESONIDE 3 MG/1
9 CAPSULE, COATED PELLETS ORAL DAILY
Qty: 90 CAPSULE | Refills: 0 | Status: SHIPPED | OUTPATIENT
Start: 2022-05-25 | End: 2022-06-08

## 2022-05-31 DIAGNOSIS — R14.0 BLOATING: ICD-10-CM

## 2022-05-31 RX ORDER — OMEPRAZOLE 20 MG/1
CAPSULE, DELAYED RELEASE ORAL
Qty: 90 CAPSULE | Refills: 1 | Status: SHIPPED | OUTPATIENT
Start: 2022-05-31

## 2022-06-08 ENCOUNTER — OFFICE VISIT (OUTPATIENT)
Dept: GASTROENTEROLOGY | Facility: CLINIC | Age: 47
End: 2022-06-08
Payer: COMMERCIAL

## 2022-06-08 VITALS
DIASTOLIC BLOOD PRESSURE: 90 MMHG | WEIGHT: 238 LBS | BODY MASS INDEX: 39.65 KG/M2 | HEIGHT: 65 IN | TEMPERATURE: 96.6 F | SYSTOLIC BLOOD PRESSURE: 110 MMHG

## 2022-06-08 DIAGNOSIS — K52.82 EOSINOPHILIC COLITIS: Primary | ICD-10-CM

## 2022-06-08 DIAGNOSIS — K58.0 IRRITABLE BOWEL SYNDROME WITH DIARRHEA: ICD-10-CM

## 2022-06-08 DIAGNOSIS — K21.00 GASTROESOPHAGEAL REFLUX DISEASE WITH ESOPHAGITIS WITHOUT HEMORRHAGE: ICD-10-CM

## 2022-06-08 DIAGNOSIS — K52.82 EOSINOPHILIC COLITIS: ICD-10-CM

## 2022-06-08 PROCEDURE — 99214 OFFICE O/P EST MOD 30 MIN: CPT | Performed by: INTERNAL MEDICINE

## 2022-06-08 RX ORDER — BUDESONIDE 3 MG/1
9 CAPSULE, COATED PELLETS ORAL DAILY
Qty: 270 CAPSULE | Refills: 1 | Status: SHIPPED | OUTPATIENT
Start: 2022-06-08 | End: 2022-07-08

## 2022-06-08 NOTE — PROGRESS NOTES
[facility] - Outpatient Consultation  Elia Delatorre 52 y o  female MRN: [id number]  Encounter: [id number]          ASSESSMENT AND PLAN:      1  Eosinophilic colitis  2  Irritable bowel syndrome with diarrhea  3  Gastroesophageal reflux disease with esophagitis without hemorrhage  80-year-old female with chronic diarrhea and bloating presenting for follow-up regarding ongoing symptoms  Her symptoms are likely multifactorial including possibly eosinophilic colitis, IBS, small intestinal bacterial overgrowth  she underwent upper endoscopy which was unremarkable aside from grade B esophagitis and biopsies were negative for H pylori, eosinophilic esophagitis, celiac disease  She is agreeable to again trying a budesonide taper  I have instructed her to go to 6 mg for 2 weeks followed by 3 mg for 2 weeks syncope  Thereafter  She will contact me by the portal if her symptoms return and are significant  May plan to check fecal calprotectin to assess if this is inflammatory related or IBS symptom related  In addition if significant symptoms of bloating return, she will plan to complete a hydrogen breath test to rule out SIBO  Regarding grade B esophagitis, I have instructed her to take omeprazole 20 mg for at least 12 weeks  Thereafter it can be discontinued but if she has any return of symptoms, would recommend that she resume this  Follow-up in 3 months  ______________________________________________________________________    HPI:  Patient is a 51 yo female here for ongoing evaluation of diarrhea, previous weight loss, and significant bloating  Summary of HPI:  She had had initial evaluation at [facility] which included stool studies as well as colonoscopy  On stool study she had a low pancreatic elastase but normal fecal fat content  Her fecal calprotectin was elevated to roughly 280  Celiac disease panel was negative    She underwent colonoscopy which endoscopically appeared normal including terminal ileal intubation however random biopsies demonstrated intraepithelial eosinophils and increased lymphoplasmacytic inflammation in lamina propria  After this was initially found she was started on budesonide which did initially treat her symptoms well with reduced diarrhea and cramping  However symptoms returned and she was restarted on budesonide  In addition she had been given Bentyl which she has taken with meals and in the evenings which does help control the severe cramping pain      Since her last office visit she had an upper endoscopy to ensure that there is no eosinophilic involvement in the rest of her GI tract  Biopsies were unremarkable, however she had grade B esophagitis  Leading up to that she had intermittent symptoms of heartburn or regurgitation  After EGD she was started on omeprazole  Currently overall her symptoms are well controlled although she is still on budesonide 9 mg daily  She is agreeable to try to taper down again  REVIEW OF SYSTEMS:    CONSTITUTIONAL: Denies any fever, chills, rigors, and weight loss  HEENT: Denies odynophagia, tinnitus  CARDIOVASCULAR: No chest pain or palpitations  RESPIRATORY: Denies any cough, hemoptysis, shortness of breath or dyspnea on exertion  GASTROINTESTINAL: As noted in the History of Present Illness  GENITOURINARY: No problems with urination  Denies any hematuria or dysuria  NEUROLOGIC: No dizziness or vertigo, denies headaches  MUSCULOSKELETAL: Denies any muscle or joint pain  SKIN: Denies skin rashes or itching  ENDOCRINE:  Denies intolerance to heat or cold  PSYCHOSOCIAL: Denies depression or anxiety  Denies any recent memory loss         Historical Information   Past Medical History:   Diagnosis Date    Anxiety     Arthritis     Carpal tunnel syndrome     Depression     Heel spur     History of anemia     reports as a child, and with post surgery due to blood loss    Hyperlipidemia  Psychiatric disorder     Radial tunnel syndrome      Past Surgical History:   Procedure Laterality Date    CORE DECOMPRESSION HIP Right     GANGLION CYST EXCISION Right 12/7/2021    Procedure: Right volar radial ganglion cyst excision;  Surgeon: Michelle Pedraza MD;  Location: [facility];  Service: Orthopedics    JOINT REPLACEMENT Right 2016    [facility]    MI WRIST Stefanie Harris LIG Right 12/7/2021    Procedure: Right endoscopic carpal tunnel release;  Surgeon: Michelle Pedraza MD;  Location: [facility];  Service: Orthopedics    TOTAL HIP ARTHROPLASTY       Social History   Social History     Substance and Sexual Activity   Alcohol Use No    Comment: Denies     Social History     Substance and Sexual Activity   Drug Use No    Comment: Denies      Social History     Tobacco Use   Smoking Status Never Smoker   Smokeless Tobacco Never Used   Tobacco Comment    Denies      Family History   Problem Relation Age of Onset    Heart disease Father     Glaucoma Father     Diabetes Father     Colon polyps Father     No Known Problems Mother     No Known Problems Maternal Grandmother     No Known Problems Maternal Grandfather     No Known Problems Paternal Grandmother     Colon polyps Paternal Grandfather     No Known Problems Half-Sister     No Known Problems Maternal Aunt     No Known Problems Maternal Aunt     Colon cancer Neg Hx        Meds/Allergies       Current Outpatient Medications:     acetaminophen (TYLENOL) 650 mg CR tablet    ascorbic acid (VITAMIN C) 500 MG tablet    budesonide (ENTOCORT EC) 3 MG capsule    Cholecalciferol (Vitamin D3) 50 MCG (2000 UT) capsule    cholestyramine (QUESTRAN) 4 g packet    cyclobenzaprine (FLEXERIL) 5 mg tablet    dicyclomine (BENTYL) 10 mg capsule    DULoxetine (CYMBALTA) 60 mg delayed release capsule    Lidocaine 4 % PTCH    metFORMIN HCl  MG/5ML SRER    naproxen sodium (ALEVE) 220 MG tablet    omeprazole (PriLOSEC) 20 mg delayed release capsule    traMADol (ULTRAM) 50 mg tablet    traZODone (DESYREL) 100 mg tablet    pyridoxine (VITAMIN B6) 100 mg tablet    No Known Allergies        Objective     Blood pressure 110/90, temperature (!) 96 6 °F (35 9 °C), temperature source Tympanic, height 5' 5" (1 651 m), weight 108 kg (238 lb), not currently breastfeeding  Body mass index is 39 61 kg/m²  PHYSICAL EXAM:      General Appearance:   Alert, cooperative, no distress   HEENT:   Normocephalic, atraumatic, anicteric  Neck:  Supple, symmetrical, trachea midline   Lungs:   Clear to auscultation bilaterally; no rales, rhonchi or wheezing; respirations unlabored    Heart[de-identified]   Regular rate and rhythm; no murmur, rub, or gallop  Abdomen:   Soft, non-tender, non-distended; normal bowel sounds; no masses, no organomegaly    Genitalia:   Deferred    Rectal:   Deferred    Extremities:  No cyanosis, clubbing or edema    Pulses:  2+ and symmetric    Skin:  No jaundice, rashes, or lesions          Lab Results:   No visits with results within 1 Day(s) from this visit  Latest known visit with results is:   Office Visit on 04/26/2022   Component Date Value    POCT SARS-CoV-2 Ag 04/26/2022 Negative     VALID CONTROL 04/26/2022 Valid          Radiology Results:   No results found

## 2022-06-14 ENCOUNTER — APPOINTMENT (OUTPATIENT)
Dept: LAB | Facility: CLINIC | Age: 47
End: 2022-06-14
Payer: COMMERCIAL

## 2022-06-14 DIAGNOSIS — Z00.8 HEALTH EXAMINATION IN POPULATION SURVEY: ICD-10-CM

## 2022-06-14 LAB
CHOLEST SERPL-MCNC: 215 MG/DL
EST. AVERAGE GLUCOSE BLD GHB EST-MCNC: 97 MG/DL
HBA1C MFR BLD: 5 %
HDLC SERPL-MCNC: 60 MG/DL
LDLC SERPL CALC-MCNC: 134 MG/DL (ref 0–100)
NONHDLC SERPL-MCNC: 155 MG/DL
TRIGL SERPL-MCNC: 107 MG/DL

## 2022-06-14 PROCEDURE — 80061 LIPID PANEL: CPT

## 2022-06-14 PROCEDURE — 83036 HEMOGLOBIN GLYCOSYLATED A1C: CPT

## 2022-06-14 PROCEDURE — 36415 COLL VENOUS BLD VENIPUNCTURE: CPT

## 2022-07-20 DIAGNOSIS — K58.0 IRRITABLE BOWEL SYNDROME WITH DIARRHEA: Primary | ICD-10-CM

## 2022-09-04 DIAGNOSIS — N76.0 BACTERIAL VAGINOSIS: Primary | ICD-10-CM

## 2022-09-04 DIAGNOSIS — B96.89 BACTERIAL VAGINOSIS: Primary | ICD-10-CM

## 2022-09-04 RX ORDER — METRONIDAZOLE 500 MG/1
500 TABLET ORAL EVERY 12 HOURS SCHEDULED
Qty: 14 TABLET | Refills: 0 | Status: SHIPPED | OUTPATIENT
Start: 2022-09-04 | End: 2022-09-11

## 2022-09-14 DIAGNOSIS — N39.0 URINARY TRACT INFECTION WITHOUT HEMATURIA, SITE UNSPECIFIED: Primary | ICD-10-CM

## 2022-09-14 RX ORDER — NITROFURANTOIN 25; 75 MG/1; MG/1
100 CAPSULE ORAL 2 TIMES DAILY
Qty: 10 CAPSULE | Refills: 0 | Status: SHIPPED | OUTPATIENT
Start: 2022-09-14 | End: 2022-09-19

## 2022-10-18 DIAGNOSIS — M47.816 LUMBAR SPONDYLOSIS: Primary | ICD-10-CM

## 2022-10-18 RX ORDER — METHYLPREDNISOLONE 4 MG/1
TABLET ORAL
Qty: 21 TABLET | Refills: 0 | Status: SHIPPED | OUTPATIENT
Start: 2022-10-18

## 2022-11-01 ENCOUNTER — TELEPHONE (OUTPATIENT)
Dept: PAIN MEDICINE | Facility: CLINIC | Age: 47
End: 2022-11-01

## 2022-11-01 NOTE — TELEPHONE ENCOUNTER
No Show - S/w Pt to reschedule ovs with Serjio    Pt states that shes still in bed and completely forgot    Pt wants to call back later to reschedule

## 2022-12-10 ENCOUNTER — TELEPHONE (OUTPATIENT)
Dept: URGENT CARE | Facility: CLINIC | Age: 47
End: 2022-12-10

## 2022-12-10 DIAGNOSIS — N76.0 ACUTE VAGINITIS: Primary | ICD-10-CM

## 2022-12-10 DIAGNOSIS — R33.8 ACUTE URINARY RETENTION: ICD-10-CM

## 2022-12-10 RX ORDER — FLUCONAZOLE 200 MG/1
200 TABLET ORAL ONCE
Qty: 1 TABLET | Refills: 1 | Status: SHIPPED | OUTPATIENT
Start: 2022-12-10 | End: 2022-12-10

## 2022-12-11 NOTE — TELEPHONE ENCOUNTER
pt has copious white thick discharge and has noticed large leuks in urine  Pt has treated with monistat without improvement x3  Will send Diflucan and urine culture to be sent

## 2022-12-22 LAB
BACTERIA UR CULT: ABNORMAL
BACTERIA UR CULT: ABNORMAL

## 2023-01-07 ENCOUNTER — TELEPHONE (OUTPATIENT)
Dept: URGENT CARE | Facility: CLINIC | Age: 48
End: 2023-01-07

## 2023-01-07 DIAGNOSIS — R30.0 DYSURIA: Primary | ICD-10-CM

## 2023-01-08 ENCOUNTER — TELEPHONE (OUTPATIENT)
Dept: URGENT CARE | Facility: CLINIC | Age: 48
End: 2023-01-08

## 2023-01-08 ENCOUNTER — OFFICE VISIT (OUTPATIENT)
Dept: URGENT CARE | Facility: CLINIC | Age: 48
End: 2023-01-08

## 2023-01-08 DIAGNOSIS — N30.01 ACUTE CYSTITIS WITH HEMATURIA: Primary | ICD-10-CM

## 2023-01-08 RX ORDER — AMOXICILLIN 500 MG/1
500 CAPSULE ORAL EVERY 12 HOURS SCHEDULED
Qty: 20 CAPSULE | Refills: 0 | Status: SHIPPED | OUTPATIENT
Start: 2023-01-08 | End: 2023-01-18

## 2023-01-08 NOTE — TELEPHONE ENCOUNTER
pt states that she started having discharge and dysuria again which improved while she was on amoxicillin  Urine culture ordered

## 2023-01-09 NOTE — TELEPHONE ENCOUNTER
pt called and urine sample had moderate blood and large leuks, neg nitrites and protein  Urine was cloudy  will send amoxicillin to pharmacy since it helped last time

## 2023-01-11 LAB
BACTERIA UR CULT: ABNORMAL
Lab: ABNORMAL
Lab: ABNORMAL
SL AMB ANTIMICROBIAL SUSCEPTIBILITY: ABNORMAL

## 2023-12-14 ENCOUNTER — TELEPHONE (OUTPATIENT)
Age: 48
End: 2023-12-14

## 2023-12-14 NOTE — TELEPHONE ENCOUNTER
Patients GI provider:  Dr. Arielle Lezama    Number to return call: [phone]    Reason for call: Dr. Roslyn Valdes, patients PCP, contacting office asking for EGD biopsy results to be faxed to his office. Fax number is [phone].     Scheduled procedure/appointment date if applicable: n/a

## (undated) DEVICE — OCCLUSIVE GAUZE STRIP,3% BISMUTH TRIBROMOPHENATE IN PETROLATUM BLEND: Brand: XEROFORM

## (undated) DEVICE — SPONGE PVP SCRUB WING STERILE

## (undated) DEVICE — STERILE BETHLEHEM PLASTIC HAND: Brand: CARDINAL HEALTH

## (undated) DEVICE — DISPOSABLE EQUIPMENT COVER: Brand: SMALL TOWEL DRAPE

## (undated) DEVICE — GAUZE SPONGES,16 PLY: Brand: CURITY

## (undated) DEVICE — GLOVE INDICATOR PI UNDERGLOVE SZ 8 BLUE

## (undated) DEVICE — GLOVE SRG BIOGEL 7.5

## (undated) DEVICE — SUT PROLENE 4-0 PS-2 18 IN 8682G

## (undated) DEVICE — STRL COTTON TIP APPLCTR 6IN PK: Brand: CARDINAL HEALTH

## (undated) DEVICE — PADDING CAST 4 IN  COTTON STRL

## (undated) DEVICE — RETROGRADE KNIFE BOX OF 6: Brand: ECTRA

## (undated) DEVICE — ACE WRAP 3 IN VELCRO LATEX FREE

## (undated) DEVICE — CUFF TOURNIQUET 18 X 4 IN QUICK CONNECT DISP 1 BLADDER

## (undated) DEVICE — NEEDLE 25G X 1 1/2